# Patient Record
Sex: FEMALE | Race: BLACK OR AFRICAN AMERICAN | Employment: FULL TIME | URBAN - METROPOLITAN AREA
[De-identification: names, ages, dates, MRNs, and addresses within clinical notes are randomized per-mention and may not be internally consistent; named-entity substitution may affect disease eponyms.]

---
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
## 2023-01-04 DIAGNOSIS — M21.41 FLAT FEET, BILATERAL: Primary | ICD-10-CM

## 2023-01-04 DIAGNOSIS — M21.42 FLAT FEET, BILATERAL: Primary | ICD-10-CM

## 2023-01-05 ENCOUNTER — OFFICE VISIT (OUTPATIENT)
Dept: FAMILY MEDICINE CLINIC | Age: 18
End: 2023-01-05
Payer: MEDICAID

## 2023-01-05 VITALS
WEIGHT: 127.4 LBS | HEIGHT: 61 IN | SYSTOLIC BLOOD PRESSURE: 113 MMHG | OXYGEN SATURATION: 99 % | TEMPERATURE: 98.2 F | DIASTOLIC BLOOD PRESSURE: 70 MMHG | RESPIRATION RATE: 18 BRPM | BODY MASS INDEX: 24.05 KG/M2 | HEART RATE: 86 BPM

## 2023-01-05 DIAGNOSIS — Z87.898 HISTORY OF SYNCOPE: ICD-10-CM

## 2023-01-05 DIAGNOSIS — J00 ACUTE RHINITIS: Primary | ICD-10-CM

## 2023-01-05 DIAGNOSIS — R10.32 LEFT LOWER QUADRANT ABDOMINAL PAIN: ICD-10-CM

## 2023-01-05 RX ORDER — FLUTICASONE PROPIONATE 50 MCG
2 SPRAY, SUSPENSION (ML) NASAL DAILY
Qty: 1 EACH | Refills: 2 | Status: SHIPPED | OUTPATIENT
Start: 2023-01-05

## 2023-01-05 NOTE — PROGRESS NOTES
[address] [facility] [address] [place] [address]   Office [phone], Fax [phone]    Subjective:     Chief Complaint   Patient presents with    Sore Throat     X 3-4 days only when she wakes up or at night time    Nasal Congestion     X several weeks with cough    Form Completion       HPI:  Tami Salcido is a 21 y.o. female that presents for: sore throat and congestion for two weeks. [facility] and was [facility] Zyrtec and an intranasal antihistamine. These medications did not improve patient's symptoms so she stopped taking them in general first of this year. Patient also reports mild left lower quadrant pain which started this morning. Patient's last bowel movement was 2 days ago and normal in consistency. Patient denies any dysuria, nausea, vomiting, fever, chills, shortness of breath or any other complaints at this time. Patient also wanted to inquire about filling out paperwork for medical clearance to join the [facility]. Patient has a history of syncope which she has not had in over 2 years. Patient has been seen by cardiology and neurology and had extensive work-up which was benign. Patient has requested records to be sent to us but they have not arrived yet. Patient says she will contact her specialist today and will try to fax the records to us. ROS:   ROS    Health Maintenance:  Health Maintenance Due   Topic Date Due    Hepatitis C Screening  Never done    COVID-19 Vaccine (1) Never done    DTaP/Tdap/Td series (1 - Tdap) Never done    HPV Age 9Y-34Y (2 - 3-dose series) 03/26/2021    Flu Vaccine (1) Never done        Past Medical Hx  I personally reviewed. Past Medical History:   Diagnosis Date    Contact dermatitis and eczema due to cause     Comes and goes. Since a child; hasn't had it for the last 2-3 years    Fainting spell     has been told fainting spells or seizures lasts for 25 sec. (once a month); last episode about 2 years ago.  Has had a 30 day heart monitor which was normal. Also has had a head CT and EEG were normal.    Headache     Resolved now    Palpitations     last episode last year; has seen cardiology and has had a heart monitor which showed normal results        SocHx   I personally reviewed. Social History     Socioeconomic History    Marital status: SINGLE     Spouse name: Not on file    Number of children: Not on file    Years of education: Not on file    Highest education level: Not on file   Occupational History    Not on file   Tobacco Use    Smoking status: Never     Passive exposure: Never    Smokeless tobacco: Never   Vaping Use    Vaping Use: Never used   Substance and Sexual Activity    Alcohol use: Never    Drug use: Not Currently     Types: Marijuana     Comment: was using once a month; quit 1 year ago    Sexual activity: Yes     Partners: Male     Birth control/protection: Pill, Condom     Comment: Has a boyfriend   Other Topics Concern    Not on file   Social History Narrative    Not on file     Social Determinants of Health     Financial Resource Strain: Low Risk     Difficulty of Paying Living Expenses: Not hard at all   Food Insecurity: No Food Insecurity    Worried About Running Out of Food in the Last Year: Never true    Ran Out of Food in the Last Year: Never true   Transportation Needs: Not on file   Physical Activity: Not on file   Stress: Not on file   Social Connections: Not on file   Intimate Partner Violence: Not on file   Housing Stability: Not on file        Allergies  I personally reviewed. No Known Allergies     Medications  I personally reviewed. Current Outpatient Medications on File Prior to Visit   Medication Sig Dispense Refill    [DISCONTINUED] norethindrone-ethinyl estradiol (Junel FE 1/20, 28,) 1 mg-20 mcg (21)/75 mg (7) tab Take 1 Tablet by mouth daily. (Patient not taking: Reported on 1/5/2023) 3 Dose Pack 4     No current facility-administered medications on file prior to visit.         Objective: Vitals  I personally reviewed. Visit Vitals  /70 (BP 1 Location: Right upper arm, BP Patient Position: Sitting, BP Cuff Size: Adult)   Pulse 86   Temp 98.2 °F (36.8 °C) (Oral)   Resp 18   Ht 5' 1\" (1.549 m)   Wt 127 lb 6.4 oz (57.8 kg)   LMP 12/15/2022 (Approximate)   SpO2 99%   BMI 24.07 kg/m²        Physical Exam  Physical Exam     Vitals Reviewed. General AO x 3. No distress. Not diaphoretic. No jaundice. No cyanosis. No pallor. Neck No thyromegaly present. No JVD. No cervical adenopathy. Cardio Normal rate, regular rhythm. No murmur, rubs, or gallop. Pulmonary Effort normal. CTAB. No wheezes, rales, or rhonchi. Abdominal Soft. Bowel sounds normal. Mild tenderness to palpation in LLQ. No masses. No distension. Extremities No edema of lower extremities. Pulses present. Neurological CN II-XII grossly intact. No focal deficits. Skin No rash. Assessment/Plan:       Diagnoses and all orders for this visit:    1. Acute rhinitis -2-week history of congestion, postnasal drip, cough and no fever or chills or any other symptoms. Patient's symptoms did not improve with Zyrtec and intranasal antihistamine [facility].  Physical exam is unremarkable. We will prescribe Flonase 2 puff in each nostril daily and follow-up in 1 week if symptoms persist or fail to improve. Correct use of Flonase was reviewed with patient. -     fluticasone propionate (FLONASE) 50 mcg/actuation nasal spray; 2 Sprays by Both Nostrils route daily. 2. History of syncope- she had fainting spells or seizures lasts for 25 sec. During high school (once a month); last episode about 2 years ago. Has been evaluated by neurology and cardiology. Has had a 30 day heart monitor which was normal. Also has had a head CT and EEG were normal.  Now that she is trying to join the [facility] will need some paper work to be filled out and.   Patient advised that we can do this but we are still waiting to receive the medical records from her cardiology and neurology. Patient will contact the specialist today and we will try to fax medical records to our clinic. 3. Left lower quadrant abdominal pain -patient reporting left lower quadrant pain starting this morning with minimal nausea, vomiting or any other complaints. Physical exam remarkable for mild tenderness to palpation in the left lower quadrant but otherwise unremarkable. Patient offered further work-up with blood work versus monitoring and follow-up in 1 week. Patient is agreeable to follow-up in 1 week if symptoms persist or fail to improve. Follow up: Follow-up and Dispositions    Return in about 1 week (around 1/12/2023), or if symptoms worsen or fail to improve. Pt was discussed with Dr Marcelina River (attending physician). I have reviewed patient medical and social history and medications. I have reviewed pertinent labs results and other data. I have discussed the diagnosis with the patient and the intended plan as seen in the above orders. The patient has received an after-visit summary and questions were answered concerning future plans. I have discussed medication side effects and warnings with the patient as well.     Flaco Black MD  Resident [facility]  01/05/23

## 2023-01-05 NOTE — PROGRESS NOTES
Identified pt with two pt identifiers(name and ). Reviewed record in preparation for visit and have obtained necessary documentation. Chief Complaint   Patient presents with    Sore Throat     X 3-4 days only when she wakes up or at night time    Nasal Congestion     X several weeks with cough    Form Completion        Health Maintenance Due   Topic    Hepatitis C Screening     COVID-19 Vaccine (1)    DTaP/Tdap/Td series (1 - Tdap)    HPV Age 9Y-34Y (2 - 3-dose series)    Flu Vaccine (1)       Visit Vitals  /70 (BP 1 Location: Right upper arm, BP Patient Position: Sitting, BP Cuff Size: Adult)   Pulse 86   Temp 98.2 °F (36.8 °C) (Oral)   Resp 18   Ht 5' 1\" (1.549 m)   Wt 127 lb 6.4 oz (57.8 kg)   SpO2 99%   BMI 24.07 kg/m²         Coordination of Care Questionnaire:  :   1) Have you been to an emergency room, urgent care, or hospitalized since your last visit? If yes, where when, and reason for visit? no       2. Have seen or consulted any other health care provider since your last visit? If yes, where when, and reason for visit? NO        Patient is accompanied by self I have received verbal consent from Mayela Trevino to discuss any/all medical information while they are present in the room.

## 2023-01-09 NOTE — PROGRESS NOTES
[address] Medicine Residency Attending Addendum:  Patient encounter was discussed on the day of the encounter with Babita Eugene MD, performing the key elements of the service. I discussed the findings, assessment and plan with the resident and agree with the resident's findings and plan as documented in the resident's note.       Abe Lakhani MD, CAQSM, RMSK

## 2023-01-31 ENCOUNTER — OFFICE VISIT (OUTPATIENT)
Dept: FAMILY MEDICINE CLINIC | Age: 18
End: 2023-01-31
Payer: MEDICAID

## 2023-01-31 VITALS
SYSTOLIC BLOOD PRESSURE: 100 MMHG | HEART RATE: 89 BPM | WEIGHT: 129 LBS | RESPIRATION RATE: 16 BRPM | DIASTOLIC BLOOD PRESSURE: 65 MMHG | TEMPERATURE: 98.2 F | BODY MASS INDEX: 24.35 KG/M2 | OXYGEN SATURATION: 100 % | HEIGHT: 61 IN

## 2023-01-31 DIAGNOSIS — Z00.00 ANNUAL PHYSICAL EXAM: Primary | ICD-10-CM

## 2023-01-31 DIAGNOSIS — Z11.59 ENCOUNTER FOR HEPATITIS C SCREENING TEST FOR LOW RISK PATIENT: ICD-10-CM

## 2023-01-31 NOTE — PROGRESS NOTES
Vernell Merida is a 21 y.o. female    Chief Complaint   Patient presents with    Complete Physical     Patient is coming in for complete physical. Patient is requesting labs to check her iron levels. She was told she was anemic. Patient declined fu shot. No other concerns. 1. Have you been to the ER, urgent care clinic since your last visit? Hospitalized since your last visit? No    2. Have you seen or consulted any other health care providers outside of the [address] [place] [address] since your last visit? Include any pap smears or colon screening. No      Visit Vitals  /65 (BP 1 Location: Right upper arm, BP Patient Position: Sitting)   Pulse 89   Temp 98.2 °F (36.8 °C) (Oral)   Resp 16   Ht 5' 1\" (1.549 m)   Wt 129 lb (58.5 kg)   SpO2 100%   BMI 24.37 kg/m²           Health Maintenance Due   Topic Date Due    Hepatitis C Screening  Never done    COVID-19 Vaccine (1) Never done    DTaP/Tdap/Td series (1 - Tdap) Never done    HPV Age 9Y-34Y (2 - 3-dose series) 03/26/2021    Flu Vaccine (1) Never done         Medication Reconciliation completed, changes noted.   Please  Update medication list.

## 2023-01-31 NOTE — PROGRESS NOTES
Subjective   CC:  Leti Lewis is a 21 y.o. female who presents for annual physical.    H/o ISRRAEL:   - Dx'ed with ISRRAEL in past, was on iron supplements  - Trying to enlist in the [facility], for which she would be need to be off iron supplements  - Last iron supplementation was in June of 2022  - No f/c, n/v, lightheadedness, palp, dyspnea, cp, fatigue, HMB, melena, hematochezia, hematemesis, coffe ground emesis noted. Gyn care:   - Irregular periods on Nexplanon  - Having a lot of breakthrough bleeding since placement   - One male sexual partner in past year     HM:   - No fhx cancer (specifically breast, colon, or cervical)  - Last dentist appt Nov  - Last eye appt Nov     Complete ROS performed and pertinent positives/negatives are documented in HPI. Past Medical History  Past Medical History:   Diagnosis Date    Contact dermatitis and eczema due to cause     Comes and goes. Since a child; hasn't had it for the last 2-3 years    Fainting spell     has been told fainting spells or seizures lasts for 25 sec. (once a month); last episode about 2 years ago. Has had a 30 day heart monitor which was normal. Also has had a head CT and EEG were normal.    Headache     Resolved now    Palpitations     last episode last year; has seen cardiology and has had a heart monitor which showed normal results       Current Medications  Current Outpatient Medications   Medication Sig Dispense Refill    fluticasone propionate (FLONASE) 50 mcg/actuation nasal spray 2 Sprays by Both Nostrils route daily.  (Patient not taking: Reported on 1/31/2023) 1 Each 2       Allergies  No Known Allergies    Social History   Social History     Socioeconomic History    Marital status: SINGLE     Spouse name: Not on file    Number of children: Not on file    Years of education: Not on file    Highest education level: Not on file   Occupational History    Not on file   Tobacco Use    Smoking status: Never     Passive exposure: Never    Smokeless tobacco: Never   Vaping Use    Vaping Use: Never used   Substance and Sexual Activity    Alcohol use: Never    Drug use: Not Currently     Types: Marijuana     Comment: was using once a month; quit 1 year ago    Sexual activity: Yes     Partners: Male     Birth control/protection: Pill, Condom     Comment: Has a boyfriend   Other Topics Concern    Not on file   Social History Narrative    Not on file     Social Determinants of Health     Financial Resource Strain: Low Risk     Difficulty of Paying Living Expenses: Not hard at all   Food Insecurity: No Food Insecurity    Worried About Running Out of Food in the Last Year: Never true    Ran Out of Food in the Last Year: Never true   Transportation Needs: Not on file   Physical Activity: Not on file   Stress: Not on file   Social Connections: Not on file   Intimate Partner Violence: Not on file   Housing Stability: Not on file       Family History  Family History   Problem Relation Age of Onset    OSTEOARTHRITIS Mother     Asthma Mother     Bradycardia Mother     Stroke Father     Other Father         blood clots    Depression Father     Anxiety Father     Lung Disease Father         COPD    Alcohol abuse Father     Diabetes Father     Elevated Lipids Father         High Cholesterol    Headache Father     Heart Disease Father     Hypertension Father     No Known Problems Sister     No Known Problems Brother     Leukemia Maternal Grandmother        Objective   Vital Signs  Visit Vitals  /65 (BP 1 Location: Right upper arm, BP Patient Position: Sitting)   Pulse 89   Temp 98.2 °F (36.8 °C) (Oral)   Resp 16   Ht 5' 1\" (1.549 m)   Wt 129 lb (58.5 kg)   SpO2 100%   BMI 24.37 kg/m²       Physical Examination  Physical Exam  Constitutional:       Appearance: Normal appearance. HENT:      Head: Normocephalic and atraumatic. Eyes:      Conjunctiva/sclera: Conjunctivae normal.   Cardiovascular:      Rate and Rhythm: Normal rate and regular rhythm.    Pulmonary: Effort: Pulmonary effort is normal.      Breath sounds: Normal breath sounds. Abdominal:      General: Abdomen is flat. Palpations: Abdomen is soft. Musculoskeletal:         General: Normal range of motion. Cervical back: Normal range of motion and neck supple. Skin:     General: Skin is warm. Neurological:      General: No focal deficit present. Mental Status: She is alert and oriented to person, place, and time. Assessment and Plan   Juan Hernandez is a 21 y.o. who is here for annual physical.    1. Annual physical exam  Labs ordered today  return in one year   - CBC W/O DIFF; Future  - METABOLIC PANEL, COMPREHENSIVE; Future  - LIPID PANEL; Future  - HEPATITIS C AB; Future  - HEPATITIS C AB  - LIPID PANEL  - METABOLIC PANEL, COMPREHENSIVE  - CBC W/O DIFF    2. Encounter for hepatitis C screening test for low risk patient  - HEPATITIS C AB; Future  - HEPATITIS C AB     RTC 1 yr for annual physical or sooner prn. Patient is counseled to return to the office if symptoms do not improve as expected. Urgent consultation with the nearest Emergency Department is strongly recommended if condition worsens. Patient is counseled to follow up as recommended and to inform the office if any changes in treatment are recommended.       I discussed this patient with Dr. Henna Tiwari (Attending Physician)       Signed By:  Michael Morrison MD  Family Medicine Resident

## 2023-02-01 LAB
ALBUMIN SERPL-MCNC: 4.7 G/DL (ref 3.9–5)
ALBUMIN/GLOB SERPL: 1.6 {RATIO} (ref 1.2–2.2)
ALP SERPL-CCNC: 72 IU/L (ref 42–106)
ALT SERPL-CCNC: 10 IU/L (ref 0–32)
AST SERPL-CCNC: 15 IU/L (ref 0–40)
BILIRUB SERPL-MCNC: 0.7 MG/DL (ref 0–1.2)
BUN SERPL-MCNC: 8 MG/DL (ref 6–20)
BUN/CREAT SERPL: 10 (ref 9–23)
CALCIUM SERPL-MCNC: 9.6 MG/DL (ref 8.7–10.2)
CHLORIDE SERPL-SCNC: 103 MMOL/L (ref 96–106)
CHOLEST SERPL-MCNC: 113 MG/DL (ref 100–169)
CO2 SERPL-SCNC: 23 MMOL/L (ref 20–29)
CREAT SERPL-MCNC: 0.83 MG/DL (ref 0.57–1)
EGFRCR SERPLBLD CKD-EPI 2021: 104 ML/MIN/1.73
ERYTHROCYTE [DISTWIDTH] IN BLOOD BY AUTOMATED COUNT: 17.1 % (ref 11.7–15.4)
GLOBULIN SER CALC-MCNC: 3 G/DL (ref 1.5–4.5)
GLUCOSE SERPL-MCNC: 75 MG/DL (ref 70–99)
HCT VFR BLD AUTO: 38.6 % (ref 34–46.6)
HCV AB S/CO SERPL IA: <0.1 S/CO RATIO (ref 0–0.9)
HDLC SERPL-MCNC: 65 MG/DL
HGB BLD-MCNC: 12 G/DL (ref 11.1–15.9)
IMP & REVIEW OF LAB RESULTS: NORMAL
LDLC SERPL CALC-MCNC: 37 MG/DL (ref 0–109)
MCH RBC QN AUTO: 22.3 PG (ref 26.6–33)
MCHC RBC AUTO-ENTMCNC: 31.1 G/DL (ref 31.5–35.7)
MCV RBC AUTO: 72 FL (ref 79–97)
PLATELET # BLD AUTO: 357 X10E3/UL (ref 150–450)
POTASSIUM SERPL-SCNC: 4.3 MMOL/L (ref 3.5–5.2)
PROT SERPL-MCNC: 7.7 G/DL (ref 6–8.5)
RBC # BLD AUTO: 5.38 X10E6/UL (ref 3.77–5.28)
SODIUM SERPL-SCNC: 141 MMOL/L (ref 134–144)
TRIGL SERPL-MCNC: 43 MG/DL (ref 0–89)
VLDLC SERPL CALC-MCNC: 11 MG/DL (ref 5–40)
WBC # BLD AUTO: 7.2 X10E3/UL (ref 3.4–10.8)

## 2023-02-02 ENCOUNTER — TELEPHONE (OUTPATIENT)
Dept: FAMILY MEDICINE CLINIC | Age: 18
End: 2023-02-02

## 2023-02-02 NOTE — TELEPHONE ENCOUNTER
----- Message from Fred Correa sent at 1/30/2023 12:34 PM EST -----  Subject: Message to Provider    QUESTIONS  Information for Provider? Patient needs lab orders for anemia. Please call   to discuss further and schedule a lab appt.  ---------------------------------------------------------------------------  --------------  Mitra Avila QZ  [phone]; OK to leave message on voicemail  ---------------------------------------------------------------------------  --------------  SCRIPT ANSWERS  Relationship to Patient?  Self

## 2023-02-08 ENCOUNTER — TELEPHONE (OUTPATIENT)
Dept: FAMILY MEDICINE CLINIC | Age: 18
End: 2023-02-08

## 2023-02-08 NOTE — TELEPHONE ENCOUNTER
Patient came into clinic to  her letter. I stated that I did not see any letter and that it may not be completed yet. Patient stated she dropped of her medical records last week and it was placed in your folder. She also stated that she did get blood work done as well. Please let me know when this is completed so I can let patient know. Patient stated she does need this today.

## 2023-02-20 ENCOUNTER — HOSPITAL ENCOUNTER (EMERGENCY)
Age: 18
Discharge: HOME | End: 2023-02-20
Attending: PEDIATRICS
Payer: MEDICAID

## 2023-02-20 ENCOUNTER — OFFICE VISIT (OUTPATIENT)
Dept: OBGYN CLINIC | Age: 18
End: 2023-02-20
Payer: MEDICAID

## 2023-02-20 VITALS — WEIGHT: 134 LBS | BODY MASS INDEX: 25.32 KG/M2 | SYSTOLIC BLOOD PRESSURE: 114 MMHG | DIASTOLIC BLOOD PRESSURE: 74 MMHG

## 2023-02-20 VITALS
OXYGEN SATURATION: 100 % | BODY MASS INDEX: 25.12 KG/M2 | WEIGHT: 132.94 LBS | RESPIRATION RATE: 16 BRPM | DIASTOLIC BLOOD PRESSURE: 62 MMHG | TEMPERATURE: 98.6 F | HEART RATE: 66 BPM | SYSTOLIC BLOOD PRESSURE: 102 MMHG

## 2023-02-20 DIAGNOSIS — N93.9 ABNORMAL UTERINE BLEEDING (AUB): Primary | ICD-10-CM

## 2023-02-20 DIAGNOSIS — R55 SYNCOPE, UNSPECIFIED SYNCOPE TYPE: Primary | ICD-10-CM

## 2023-02-20 DIAGNOSIS — N93.9 VAGINAL BLEEDING: ICD-10-CM

## 2023-02-20 LAB
AMORPH CRY URNS QL MICRO: ABNORMAL
APPEARANCE UR: ABNORMAL
ATRIAL RATE: 61 BPM
BACTERIA URNS QL MICRO: ABNORMAL /HPF
BASOPHILS # BLD: 0 K/UL (ref 0–0.1)
BASOPHILS NFR BLD: 0 % (ref 0–1)
BILIRUB UR QL: NEGATIVE
CALCULATED P AXIS, ECG09: 55 DEGREES
CALCULATED R AXIS, ECG10: 49 DEGREES
CALCULATED T AXIS, ECG11: 36 DEGREES
COLOR UR: ABNORMAL
COMMENT, HOLDF: NORMAL
DIAGNOSIS, 93000: NORMAL
DIFFERENTIAL METHOD BLD: ABNORMAL
EOSINOPHIL # BLD: 0 K/UL (ref 0–0.4)
EOSINOPHIL NFR BLD: 0 % (ref 0–7)
EPITH CASTS URNS QL MICRO: ABNORMAL /LPF
ERYTHROCYTE [DISTWIDTH] IN BLOOD BY AUTOMATED COUNT: 17 % (ref 11.5–14.5)
GLUCOSE UR STRIP.AUTO-MCNC: NEGATIVE MG/DL
HCG SERPL QL: NEGATIVE
HCG URINE, QL. (POC): NEGATIVE
HCT VFR BLD AUTO: 37.9 % (ref 35–47)
HGB BLD-MCNC: 11.2 G/DL (ref 11.5–16)
HGB UR QL STRIP: ABNORMAL
IMM GRANULOCYTES # BLD AUTO: 0 K/UL (ref 0–0.04)
IMM GRANULOCYTES NFR BLD AUTO: 0 % (ref 0–0.5)
KETONES UR QL STRIP.AUTO: NEGATIVE MG/DL
LEUKOCYTE ESTERASE UR QL STRIP.AUTO: NEGATIVE
LYMPHOCYTES # BLD: 2.2 K/UL (ref 0.8–3.5)
LYMPHOCYTES NFR BLD: 25 % (ref 12–49)
MCH RBC QN AUTO: 22 PG (ref 26–34)
MCHC RBC AUTO-ENTMCNC: 29.6 G/DL (ref 30–36.5)
MCV RBC AUTO: 74.5 FL (ref 80–99)
MONOCYTES # BLD: 0.2 K/UL (ref 0–1)
MONOCYTES NFR BLD: 3 % (ref 5–13)
NEUTS SEG # BLD: 6.3 K/UL (ref 1.8–8)
NEUTS SEG NFR BLD: 72 % (ref 32–75)
NITRITE UR QL STRIP.AUTO: NEGATIVE
NRBC # BLD: 0 K/UL (ref 0–0.01)
NRBC BLD-RTO: 0 PER 100 WBC
P-R INTERVAL, ECG05: 142 MS
PH UR STRIP: 7.5 (ref 5–8)
PLATELET # BLD AUTO: 382 K/UL (ref 150–400)
PMV BLD AUTO: 9.1 FL (ref 8.9–12.9)
PROT UR STRIP-MCNC: NEGATIVE MG/DL
Q-T INTERVAL, ECG07: 400 MS
QRS DURATION, ECG06: 82 MS
QTC CALCULATION (BEZET), ECG08: 402 MS
RBC # BLD AUTO: 5.09 M/UL (ref 3.8–5.2)
RBC #/AREA URNS HPF: ABNORMAL /HPF (ref 0–5)
SAMPLES BEING HELD,HOLD: NORMAL
SP GR UR REFRACTOMETRY: 1.01 (ref 1–1.03)
TROPONIN I SERPL HS-MCNC: <3 NG/L (ref 0–37)
UR CULT HOLD, URHOLD: NORMAL
UROBILINOGEN UR QL STRIP.AUTO: 0.2 EU/DL (ref 0.2–1)
VALID INTERNAL CONTROL?: YES
VENTRICULAR RATE, ECG03: 61 BPM
WBC # BLD AUTO: 8.8 K/UL (ref 3.6–11)
WBC URNS QL MICRO: ABNORMAL /HPF (ref 0–4)

## 2023-02-20 PROCEDURE — 93005 ELECTROCARDIOGRAM TRACING: CPT

## 2023-02-20 PROCEDURE — 85025 COMPLETE CBC W/AUTO DIFF WBC: CPT

## 2023-02-20 PROCEDURE — 74011250636 HC RX REV CODE- 250/636: Performed by: STUDENT IN AN ORGANIZED HEALTH CARE EDUCATION/TRAINING PROGRAM

## 2023-02-20 PROCEDURE — 84484 ASSAY OF TROPONIN QUANT: CPT

## 2023-02-20 PROCEDURE — 81001 URINALYSIS AUTO W/SCOPE: CPT

## 2023-02-20 PROCEDURE — 99284 EMERGENCY DEPT VISIT MOD MDM: CPT

## 2023-02-20 PROCEDURE — 81025 URINE PREGNANCY TEST: CPT | Performed by: OBSTETRICS & GYNECOLOGY

## 2023-02-20 PROCEDURE — 36415 COLL VENOUS BLD VENIPUNCTURE: CPT

## 2023-02-20 PROCEDURE — 84703 CHORIONIC GONADOTROPIN ASSAY: CPT

## 2023-02-20 PROCEDURE — 99214 OFFICE O/P EST MOD 30 MIN: CPT | Performed by: OBSTETRICS & GYNECOLOGY

## 2023-02-20 RX ADMIN — SODIUM CHLORIDE 1000 ML: 9 INJECTION, SOLUTION INTRAVENOUS at 15:46

## 2023-02-20 NOTE — ED PROVIDER NOTES
Vaginal Bleeding  Pertinent negatives include no chest pain, no abdominal pain, no headaches and no shortness of breath. Patient is a 21 y.o. F who presents today with complaints of with past medical history of syncope who presents today after syncopal episode. She has had syncope in the past, had head CT, EEG, 30-day heart monitor, work-up was unremarkable, suspected vasovagal.  She came into the emergency department today after syncopal episode because she was concerned it was due to blood loss. She did have Nexplanon placed in December 2022 and reports daily vaginal bleeding since, sometimes up to 10 pads a day. She did actually see her OB/GYN today to discuss this and they recommended starting oral OCPs in addition to Nexplanon to help with vaginal bleeding and patient states that she did not want to do this. Patient reports today, she was seated in a chair, felt lightheaded and then had a syncopal episode lasting a few minutes, this was witnessed, did not fall, did not hit her head. The syncopal episode was the same in nature to previous syncopal episode she has, has not had one in about 2 years. No fevers, dysuria, confusion, seizure, no other complaints, no neurological complaints. Denies any abdominal pelvic pain, no vaginal discharge. ALLERGIES: Patient has no known allergies. Past Medical History:   Diagnosis Date    Contact dermatitis and eczema due to cause     Comes and goes. Since a child; hasn't had it for the last 2-3 years    Fainting spell     has been told fainting spells or seizures lasts for 25 sec. (once a month); last episode about 2 years ago.  Has had a 30 day heart monitor which was normal. Also has had a head CT and EEG were normal.    Headache     Resolved now    Palpitations     last episode last year; has seen cardiology and has had a heart monitor which showed normal results     Past Surgical History:   Procedure Laterality Date    HX TONSILLECTOMY  03/31/2022    HX WISDOM TEETH EXTRACTION  11/2020       Review of Systems   Constitutional:  Negative for fatigue and fever. HENT:  Negative for congestion. Respiratory:  Negative for cough, shortness of breath and wheezing. Cardiovascular:  Negative for chest pain. Gastrointestinal:  Negative for abdominal pain, constipation, diarrhea, nausea and vomiting. Genitourinary:  Positive for menstrual problem and vaginal bleeding. Negative for difficulty urinating, dysuria, flank pain, frequency, pelvic pain, vaginal discharge and vaginal pain. Musculoskeletal:  Negative for arthralgias and myalgias. Skin:  Negative for wound. Neurological:  Positive for syncope. Negative for dizziness, tremors, seizures, facial asymmetry, speech difficulty, weakness, light-headedness, numbness and headaches. Psychiatric/Behavioral:  Negative for confusion. Vitals:    02/20/23 1416 02/20/23 1420 02/20/23 1736   BP:  100/68 102/62   Pulse:  72 66   Resp:  16 16   Temp:  98.7 °F (37.1 °C) 98.6 °F (37 °C)   SpO2:  100% 100%   Weight: 60.3 kg (132 lb 15 oz)              Physical Exam  Vitals and nursing note reviewed. Constitutional:       General: She is not in acute distress. Appearance: Normal appearance. She is not ill-appearing, toxic-appearing or diaphoretic. HENT:      Head: Normocephalic and atraumatic. Nose: Nose normal.      Mouth/Throat:      Mouth: Mucous membranes are moist.   Eyes:      Pupils: Pupils are equal, round, and reactive to light. Cardiovascular:      Rate and Rhythm: Normal rate and regular rhythm. Pulmonary:      Effort: Pulmonary effort is normal. No respiratory distress. Breath sounds: Normal breath sounds. No stridor. No wheezing, rhonchi or rales. Chest:      Chest wall: No tenderness. Abdominal:      General: Abdomen is flat. Bowel sounds are normal.      Palpations: Abdomen is soft. Tenderness: There is no abdominal tenderness.  There is no right CVA tenderness, left CVA tenderness, guarding or rebound. Musculoskeletal:         General: Normal range of motion. Cervical back: Normal range of motion. Skin:     General: Skin is warm and dry. Neurological:      Mental Status: She is alert and oriented to person, place, and time. Mental status is at baseline. Cranial Nerves: No cranial nerve deficit. Sensory: Sensation is intact. Motor: No weakness. Coordination: Coordination is intact. Gait: Gait is intact. Psychiatric:         Mood and Affect: Mood normal.         Behavior: Behavior normal.         Thought Content: Thought content normal.            LABORATORY RESULTS:  Recent Results (from the past 24 hour(s))   AMB POC URINE PREGNANCY TEST, VISUAL COLOR COMPARISON    Collection Time: 02/20/23 11:06 AM   Result Value Ref Range    VALID INTERNAL CONTROL POC Yes     HCG urine, Ql. (POC) Negative Negative   CBC WITH AUTOMATED DIFF    Collection Time: 02/20/23  3:36 PM   Result Value Ref Range    WBC 8.8 3.6 - 11.0 K/uL    RBC 5.09 3.80 - 5.20 M/uL    HGB 11.2 (L) 11.5 - 16.0 g/dL    HCT 37.9 35.0 - 47.0 %    MCV 74.5 (L) 80.0 - 99.0 FL    MCH 22.0 (L) 26.0 - 34.0 PG    MCHC 29.6 (L) 30.0 - 36.5 g/dL    RDW 17.0 (H) 11.5 - 14.5 %    PLATELET 540 965 - 634 K/uL    MPV 9.1 8.9 - 12.9 FL    NRBC 0.0 0  WBC    ABSOLUTE NRBC 0.00 0.00 - 0.01 K/uL    NEUTROPHILS 72 32 - 75 %    LYMPHOCYTES 25 12 - 49 %    MONOCYTES 3 (L) 5 - 13 %    EOSINOPHILS 0 0 - 7 %    BASOPHILS 0 0 - 1 %    IMMATURE GRANULOCYTES 0 0.0 - 0.5 %    ABS. NEUTROPHILS 6.3 1.8 - 8.0 K/UL    ABS. LYMPHOCYTES 2.2 0.8 - 3.5 K/UL    ABS. MONOCYTES 0.2 0.0 - 1.0 K/UL    ABS. EOSINOPHILS 0.0 0.0 - 0.4 K/UL    ABS. BASOPHILS 0.0 0.0 - 0.1 K/UL    ABS. IMM.  GRANS. 0.0 0.00 - 0.04 K/UL    DF AUTOMATED     HCG QL SERUM    Collection Time: 02/20/23  3:36 PM   Result Value Ref Range    HCG, Ql. Negative NEG     SAMPLES BEING HELD    Collection Time: 02/20/23  3:36 PM   Result Value Ref Range SAMPLES BEING HELD 1RED, BLUE     COMMENT        Add-on orders for these samples will be processed based on acceptable specimen integrity and analyte stability, which may vary by analyte. TROPONIN-HIGH SENSITIVITY    Collection Time: 02/20/23  3:36 PM   Result Value Ref Range    Troponin-High Sensitivity <3 0 - 37 ng/L   EKG, 12 LEAD, INITIAL    Collection Time: 02/20/23  3:45 PM   Result Value Ref Range    Ventricular Rate 61 BPM    Atrial Rate 61 BPM    P-R Interval 142 ms    QRS Duration 82 ms    Q-T Interval 400 ms    QTC Calculation (Bezet) 402 ms    Calculated P Axis 55 degrees    Calculated R Axis 49 degrees    Calculated T Axis 36 degrees    Diagnosis       Normal sinus rhythm  Normal ECG  When compared with ECG of 18-NOV-2021 14:46,  No significant change was found     URINALYSIS W/MICROSCOPIC    Collection Time: 02/20/23  5:10 PM   Result Value Ref Range    Color YELLOW/STRAW      Appearance TURBID (A) CLEAR      Specific gravity 1.014 1.003 - 1.030      pH (UA) 7.5 5.0 - 8.0      Protein Negative NEG mg/dL    Glucose Negative NEG mg/dL    Ketone Negative NEG mg/dL    Bilirubin Negative NEG      Blood LARGE (A) NEG      Urobilinogen 0.2 0.2 - 1.0 EU/dL    Nitrites Negative NEG      Leukocyte Esterase Negative NEG      WBC 0-4 0 - 4 /hpf    RBC 0-5 0 - 5 /hpf    Epithelial cells FEW FEW /lpf    Bacteria 1+ (A) NEG /hpf    Amorphous Crystals 1+ (A) NEG   URINE CULTURE HOLD SAMPLE    Collection Time: 02/20/23  5:10 PM    Specimen: Urine   Result Value Ref Range    Urine culture hold        Urine on hold in Microbiology dept for 2 days. If unpreserved urine is submitted, it cannot be used for addtional testing after 24 hours, recollection will be required. IMAGING RESULTS:  No results found.     MEDICATIONS GIVEN:  Medications   sodium chloride 0.9 % bolus infusion 1,000 mL (1,000 mL IntraVENous New Bag 2/20/23 6239)            Medical Decision Making  Amount and/or Complexity of Data Reviewed  Labs: ordered. ECG/medicine tests: ordered. Patient presents today after syncopal episode. Sustained no trauma or injury from fall. Physical exam essentially unremarkable, no focal neurological deficits, alert and oriented x4, no head laceration, no cervical midline tenderness. Given history, exam and workup, low suspicion for HF, ICH (no trauma, headache), seizure (no witnessed seizure like activity, no postictal period, tongue laceration, bladder incontinence), stroke (no focal neuro deficits), HOCM (no murmur, family history of sudden death), ACS (neg troponin, no anginal pain), aortic dissection (no chest pain). no malignant arrhythmia on ekg or any family history of sudden death. no concern for GI bleed (stable hgb). Low suspicion for PE given normal vital signs, absence of chest pain or dyspnea, no evidence of DVT, no recent surgery/immobilization. Based on [place] syncope rule, patient is low risk and well appearing here, plan to discharge the patient home with PMD follow up. Patient has similar episodes in the past and has had extensive work-up which was unremarkable. Patient was mainly concerned that she had anemia because she has had increased vaginal bleeding. Hemoglobin stable. Patient to follow back up with OB/GYN to discuss vaginal bleeding and removal of Nexplanon. Did give very strict return precautions. Discussed results and work-up with patient and answered all questions, the patient expresses understanding and agrees with the care plan and disposition. The patient was given an opportunity to ask questions and all concerns raised were addressed prior to discharge. Recommended patient follow-up with provider as listed below. Counseled patient on standard home and self-care measures. Specifically explained the emergent conditions that could arise and clearly instructed the patient to return to the emergency department for those and any other new, worsening, or concerning symptoms. Patient stable and ready for discharge. History, exam, medical decision making, and plan discussed with ED attending, Dr. Spring Arshad. IMPRESSION:  1. Syncope, unspecified syncope type    2. Vaginal bleeding        DISPOSITION:  Discharge    PLAN:  Follow-up Information       Follow up With Specialties Details Why Contact Info    Ester Oliveros MD Family Medicine Schedule an appointment as soon as possible for a visit   [address] [place] [address] Renato Luong   [phone]            Current Discharge Medication List                                       Please note that this dictation was completed with [employer], the computer voice recognition software. Quite often unanticipated grammatical, syntax, homophones, and other interpretive errors are inadvertently transcribed by the computer software. Please disregard these errors. Please excuse any errors that have escaped final proofreading.

## 2023-02-20 NOTE — PROGRESS NOTES
OB/GYN Problem VIsit    HPI  Seward Babinski is a No obstetric history on file. ,  21 y.o. female who presents for a problem visit. She comes in complaining of continuous bleeding since insertion of her Nexplanon in December 2022. Prior to the Nexplanon she was having regular but heavy menses. She has also had some lightheadedness associated with the bleeding. Per Nursing Note:  Patient's last menstrual period was 02/13/2023 (exact date). Birth Control: Implant. Last Pap: The pt has never had a pap smear. The patient is reporting having:  vaginal bleeding since the insertion of Implant in Dec. 2022. She states the bleeding is heavy and associated with quarter size clots. Denies any severe cramping. She gets lightheaded at times. Past Medical History:   Diagnosis Date    Contact dermatitis and eczema due to cause     Comes and goes. Since a child; hasn't had it for the last 2-3 years    Fainting spell     has been told fainting spells or seizures lasts for 25 sec. (once a month); last episode about 2 years ago.  Has had a 30 day heart monitor which was normal. Also has had a head CT and EEG were normal.    Headache     Resolved now    Palpitations     last episode last year; has seen cardiology and has had a heart monitor which showed normal results     Past Surgical History:   Procedure Laterality Date    HX TONSILLECTOMY  03/31/2022    HX WISDOM TEETH EXTRACTION  11/2020     Social History     Occupational History    Not on file   Tobacco Use    Smoking status: Never     Passive exposure: Never    Smokeless tobacco: Never   Vaping Use    Vaping Use: Never used   Substance and Sexual Activity    Alcohol use: Never    Drug use: Not Currently     Types: Marijuana     Comment: was using once a month; quit 1 year ago    Sexual activity: Yes     Partners: Male     Birth control/protection: Pill, Condom     Comment: Has a boyfriend     Family History   Problem Relation Age of Onset    OSTEOARTHRITIS Mother     Asthma Mother     Bradycardia Mother     Stroke Father     Other Father         blood clots    Depression Father     Anxiety Father     Lung Disease Father         COPD    Alcohol abuse Father     Diabetes Father     Elevated Lipids Father         High Cholesterol    Headache Father     Heart Disease Father     Hypertension Father     No Known Problems Sister     No Known Problems Brother     Leukemia Maternal Grandmother        No Known Allergies  Prior to Admission medications    Medication Sig Start Date End Date Taking? Authorizing Provider   norethindrone-ethinyl estradiol (ORTHO-NOVUM 1-35 TAB, NORTREL 1-35 TAB) 1-35 mg-mcg tab Take 1 Tablet by mouth daily. 2/20/23  Yes Raymundo Joshi MD   fluticasone propionate (FLONASE) 50 mcg/actuation nasal spray 2 Sprays by Both Nostrils route daily. Patient not taking: No sig reported 1/5/23   Emili Mcneal MD        Review of Systems: History obtained from the patient  Constitutional: negative for weight loss, fever, night sweats  Breast: negative for breast lumps, nipple discharge, galactorrhea  GI: negative for change in bowel habits, abdominal pain, black or bloody stools  : negative for frequency, dysuria, hematuria, vaginal discharge  MSK: negative for back pain, joint pain, muscle pain  Skin: negative for itching, rash, hives  Psych: negative for anxiety, depression, change in mood      Objective:  Visit Vitals  /74   Wt 134 lb (60.8 kg)   LMP 02/13/2023 (Exact Date)   BMI 25.32 kg/m²       Physical Exam:   PHYSICAL EXAMINATION    Constitutional  Appearance: well-nourished, well developed, alert, in no acute distress      Skin  General Inspection: no rash, no lesions identified    Neurologic/Psychiatric  Mental Status:  Orientation: grossly oriented to person, place and time  Mood and Affect: mood normal, affect appropriate      ASSESSMENT:    ICD-10-CM ICD-9-CM    1.  Abnormal uterine bleeding (AUB)  N93.9 626.9 AMB POC URINE PREGNANCY TEST, VISUAL COLOR COMPARISON      CT/NG/T.VAGINALIS AMPLIFICATION      CBC WITH AUTOMATED DIFF      CBC WITH AUTOMATED DIFF          PLAN:  Orders Placed This Encounter    CT/NG/T.VAGINALIS AMPLIFICATION     Order Specific Question:   Specimen source     Answer:   Urine [258]    CBC WITH AUTOMATED DIFF     Standing Status:   Future     Number of Occurrences:   1     Standing Expiration Date:   2/20/2024    AMB POC URINE PREGNANCY TEST, VISUAL COLOR COMPARISON    norethindrone-ethinyl estradiol (ORTHO-NOVUM 1-35 TAB, NORTREL 1-35 TAB) 1-35 mg-mcg tab     Sig: Take 1 Tablet by mouth daily. Dispense:  3 Dose Pack     Refill:  1   A urine pregnancy test sent today is negative. Plan is to have her get a CBC due to her lightheadedness. We also sent out gonorrhea and chlamydia test off the urine. She will start birth control pills today and assuming it helps control her bleeding she will decide sometime in the next few months whether she wants to have the Nexplanon removed or continue the pill plus the Nexplanon for the near future.

## 2023-02-20 NOTE — ED TRIAGE NOTES
Triage Note: Pt reports she has had nexplanon since December. Pt reports vaginal bleeding since, but now she is passing clots. Pt reports bleeding became heavier earlier this month. Pt seen at an OBGYN today and told to get on birth control pills, but pt reports she has done that before and it made it worse. Pt also reports she has become light headed, dizzy, and fatigued.

## 2023-02-23 LAB
C TRACH RRNA SPEC QL NAA+PROBE: NEGATIVE
N GONORRHOEA RRNA SPEC QL NAA+PROBE: NEGATIVE
SPECIMEN STATUS REPORT, ROLRST: NORMAL
T VAGINALIS RRNA SPEC QL NAA+PROBE: NEGATIVE

## 2023-02-27 NOTE — PROGRESS NOTES
Brad Galeas is a 21 y.o. female presents for a problem visit. No chief complaint on file. Problems: Abnormal Bleeding     Patient's last menstrual period was 02/13/2023 (exact date). Birth Control: implant. Wants it removed and start OCP. Procedure note: Nexplanon/Implanon removal    Brad Galeas is a No obstetric history on file. ,  21 y.o. female whose Patient's last menstrual period was 02/13/2023 (exact date). .  She presents for office removal of a NEXPLANON/IMPLANON sub-dermal contraceptive implant. Procedure:  She was positioned so the site of her implant was visable and easily accessible. The implant was located by palpation. The end of the implant nearest the elbow was marked with a sterile marker. The operative site was cleansed with Betadine. Using a 27 gauge needle on a 3cc syringe and 1% lidocaine, 3 cc were infiltrated as a intradermal wheal and underneath the end of the implant closest to the elbow. Downward pressure was applied on the end of the implant nearest the axilla and a 2-3mm incision was made in the longitudinal direction of the arm at the tip of the implant closest to the elbow. The implant was then pushed gently toward the incision until the tip was visible. The fibrous capsule was opened with a combination of blunt and sharp dissection. The implant was grasped with mosquito forceps and removed intact. It measured a full 4 cm in length. The skin was cleansed and dried. A steristrip was applied then topped with a folded 4x4 gauze and a Curlex pressure dressing. There were no complication or problems. She demonstrated full active range of movement of her elbow, wrist, all five digits and denied numbness and tingling. Post-procedure:  She was told to remove the dressing in 12-24 hours, to keep the incision area dry for 24 hours and to remove the Steristrip in 5-7 days. She was given our 24-hour phone number and encouraged to call if there are any problems.   Rx Sprintec            Chart reviewed for the following:   Sherie Dickinson LPN, have reviewed the History, Physical and updated the Allergic reactions for Sue Sparks 11 performed immediately prior to start of procedure:   Sherie Dickinson LPN, have performed the following reviews on Martín Francisco prior to the start of the procedure:            * Patient was identified by name and date of birth   * Agreement on procedure being performed was verified  * Risks and Benefits explained to the patient  * Procedure site verified and marked as necessary  * Patient was positioned for comfort  * Consent was signed and verified     Time: 3:40pm    Date of procedure: 3/1/2023    Procedure performed by: Omar Freedman MD    Provider assisted by: Channing Hurt LPN    Patient assisted by: self    How tolerated by patient: tolerated the procedure well with no complications    Post Procedural Pain Scale: 0 - No Hurt    Comments: none    Examination chaperoned by Ed Lindsey LPN.

## 2023-03-01 ENCOUNTER — OFFICE VISIT (OUTPATIENT)
Dept: OBGYN CLINIC | Age: 18
End: 2023-03-01
Payer: MEDICAID

## 2023-03-01 VITALS — DIASTOLIC BLOOD PRESSURE: 68 MMHG | SYSTOLIC BLOOD PRESSURE: 103 MMHG | BODY MASS INDEX: 24.37 KG/M2 | WEIGHT: 129 LBS

## 2023-03-01 DIAGNOSIS — Z30.011 ENCOUNTER FOR INITIAL PRESCRIPTION OF CONTRACEPTIVE PILLS: ICD-10-CM

## 2023-03-01 DIAGNOSIS — Z30.46 ENCOUNTER FOR NEXPLANON REMOVAL: Primary | ICD-10-CM

## 2023-03-01 PROCEDURE — 11982 REMOVE DRUG IMPLANT DEVICE: CPT | Performed by: OBSTETRICS & GYNECOLOGY

## 2023-03-01 RX ORDER — NORGESTIMATE AND ETHINYL ESTRADIOL 0.25-0.035
1 KIT ORAL DAILY
Qty: 3 DOSE PACK | Refills: 3 | Status: SHIPPED | OUTPATIENT
Start: 2023-03-01

## 2023-04-19 ENCOUNTER — PATIENT MESSAGE (OUTPATIENT)
Dept: FAMILY MEDICINE CLINIC | Age: 18
End: 2023-04-19

## 2023-05-22 RX ORDER — NORGESTIMATE AND ETHINYL ESTRADIOL 0.25-0.035
1 KIT ORAL DAILY
COMMUNITY
Start: 2023-03-01

## 2023-10-10 ENCOUNTER — TELEPHONE (OUTPATIENT)
Age: 18
End: 2023-10-10

## 2023-10-10 NOTE — TELEPHONE ENCOUNTER
This writer verified patient by 2 identifier. Inquired the timeframe patient need paperwork sign by Physician. Patient states this have been addressed already by another Physician as Dr Diane Ibarra is out to office currently. No further concerns at this time.

## 2023-10-10 NOTE — TELEPHONE ENCOUNTER
----- Message from Lesley Jimenez sent at 10/9/2023  4:10 PM EDT -----  Subject: Message to Provider    QUESTIONS  Information for Provider? pt is needing a new letter for the Randy Montanez, as   the one from this past feb 2/8/23 is not in the new system, she needs it   actually signed by her PCP as well for this, please advise.   ---------------------------------------------------------------------------  --------------  [address] [place]  [phone]; OK to leave message on voicemail  ---------------------------------------------------------------------------  --------------  SCRIPT ANSWERS  Relationship to Patient?  Self

## 2024-03-29 ENCOUNTER — TELEPHONE (OUTPATIENT)
Age: 19
End: 2024-03-29

## 2024-03-29 NOTE — TELEPHONE ENCOUNTER
Attempted to reach patient to inform her that the appointment that she made for her pap smear is for a male doctor. Left voicemail stating that if she prefers a female then she would need to reschedule.

## 2024-06-20 ENCOUNTER — HOSPITAL ENCOUNTER (EMERGENCY)
Age: 19
Discharge: HOME | End: 2024-06-20
Attending: FAMILY MEDICINE
Payer: MEDICAID

## 2024-06-20 VITALS
HEART RATE: 66 BPM | HEIGHT: 61 IN | TEMPERATURE: 98.8 F | SYSTOLIC BLOOD PRESSURE: 110 MMHG | OXYGEN SATURATION: 100 % | WEIGHT: 120 LBS | DIASTOLIC BLOOD PRESSURE: 85 MMHG | BODY MASS INDEX: 22.66 KG/M2 | RESPIRATION RATE: 16 BRPM
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
## 2024-06-20 DIAGNOSIS — S31.159A DOG BITE OF GROIN: Primary | ICD-10-CM

## 2024-06-20 PROCEDURE — 99283 EMERGENCY DEPT VISIT LOW MDM: CPT

## 2024-06-20 PROCEDURE — 6370000000 HC RX 637 (ALT 250 FOR IP): Performed by: FAMILY MEDICINE

## 2024-06-20 RX ORDER — AMOXICILLIN AND CLAVULANATE POTASSIUM 875; 125 MG/1; MG/1
1 TABLET, FILM COATED ORAL
Status: COMPLETED | OUTPATIENT
Start: 2024-06-20 | End: 2024-06-20

## 2024-06-20 RX ORDER — AMOXICILLIN AND CLAVULANATE POTASSIUM 875; 125 MG/1; MG/1
1 TABLET, FILM COATED ORAL 2 TIMES DAILY
Qty: 20 TABLET | Refills: 0 | Status: SHIPPED | OUTPATIENT
Start: 2024-06-20 | End: 2024-06-30

## 2024-06-20 RX ADMIN — AMOXICILLIN AND CLAVULANATE POTASSIUM 1 TABLET: 875; 125 TABLET, FILM COATED ORAL at 21:51

## 2024-06-20 ASSESSMENT — PAIN - FUNCTIONAL ASSESSMENT: PAIN_FUNCTIONAL_ASSESSMENT: 0-10

## 2024-06-20 ASSESSMENT — LIFESTYLE VARIABLES
HOW OFTEN DO YOU HAVE A DRINK CONTAINING ALCOHOL: NEVER
HOW MANY STANDARD DRINKS CONTAINING ALCOHOL DO YOU HAVE ON A TYPICAL DAY: PATIENT DOES NOT DRINK

## 2024-06-20 ASSESSMENT — PAIN DESCRIPTION - LOCATION: LOCATION: VAGINA

## 2024-06-20 ASSESSMENT — PAIN SCALES - GENERAL: PAINLEVEL_OUTOF10: 3

## 2024-06-20 ASSESSMENT — PAIN DESCRIPTION - DESCRIPTORS: DESCRIPTORS: ACHING

## 2024-06-21 ASSESSMENT — ENCOUNTER SYMPTOMS
GASTROINTESTINAL NEGATIVE: 1
RESPIRATORY NEGATIVE: 1

## 2024-06-21 NOTE — ED TRIAGE NOTES
Pt. Experienced a dog bite to her left labia around 2.5 hrs ago when walking.  [facility] has been contacted. Pt. States the dog was on a porch in a house she was trying to enter and the dog lunged at her. Pt. Did state that she asked the owner about being up to date on shots and the owner did confirm.

## 2024-06-21 NOTE — ED NOTES
[facility] at bedside at this time.    I have reviewed discharge instructions with the patient.  The patient verbalized understanding.

## 2024-06-21 NOTE — ED PROVIDER NOTES
with the below findings:    NA    Interpretation per the Radiologist below, if available at the time of this note:    No orders to display         ED BEDSIDE ULTRASOUND:   Performed by ED Physician - none    LABS:  Labs Reviewed - No data to display    All other labs were within normal range or not returned as of this dictation.    EMERGENCY DEPARTMENT COURSE and DIFFERENTIAL DIAGNOSIS/MDM:   Vitals:    Vitals:    06/20/24 2120   BP: 110/85   Pulse: 66   Resp: 16   Temp: 98.8 °F (37.1 °C)   TempSrc: Oral   SpO2: 100%   Weight: 54.4 kg (120 lb)   Height: 1.549 m (5' 1\")       Medical Decision Making  Ddx including puncture wound, vascular injury, tendon injury, need for rabies prophylaxis. No indication for rabies prophylaxis as patient was able to verify vaccination status of the animal. No evidence of underlying structure injury. Will prescribe abx based on location. Wound care discussed. Stable for discharge with outpatient follow up. Return precautions given    Amount and/or Complexity of Data Reviewed  External Data Reviewed: notes.    Risk  Prescription drug management.            REASSESSMENT          CRITICAL CARE TIME   Total Critical Care time was 0 minutes, excluding separately reportable procedures.  There was a high probability of clinically significant/life threatening deterioration in the patient's condition which required my urgent intervention.      CONSULTS:  None    PROCEDURES:  Unless otherwise noted below, none     Procedures      FINAL IMPRESSION      1. Dog bite of groin          DISPOSITION/PLAN   DISPOSITION Decision To Discharge 06/20/2024 09:23:49 PM      PATIENT REFERRED TO:  Dennis Manley MD  [address] [facility] [address] [phone]    Schedule an appointment as soon as possible for a visit in 1 week  As needed, For wound re-check      DISCHARGE MEDICATIONS:  New Prescriptions    AMOXICILLIN-CLAVULANATE (AUGMENTIN) 875-125 MG PER TABLET    Take 1 tablet by mouth 2 times

## 2024-06-21 NOTE — DISCHARGE INSTRUCTIONS
Keep area clean and dry. Take antibiotic as prescribed. Follow up with your Primary Doctor. Return to the ED for new or worsening symptoms

## 2024-09-01 ENCOUNTER — HOSPITAL ENCOUNTER (EMERGENCY)
Age: 19
Discharge: HOME | End: 2024-09-01
Attending: EMERGENCY MEDICINE
Payer: MEDICAID

## 2024-09-01 VITALS
HEIGHT: 60 IN | OXYGEN SATURATION: 100 % | TEMPERATURE: 98.2 F | DIASTOLIC BLOOD PRESSURE: 81 MMHG | SYSTOLIC BLOOD PRESSURE: 130 MMHG | BODY MASS INDEX: 25.13 KG/M2 | WEIGHT: 128 LBS | HEART RATE: 79 BPM | RESPIRATION RATE: 16 BRPM

## 2024-09-01 DIAGNOSIS — Z34.91 NORMAL PREGNANCY IN FIRST TRIMESTER: Primary | ICD-10-CM

## 2024-09-01 LAB
AMORPH CRY URNS QL MICRO: ABNORMAL
APPEARANCE UR: ABNORMAL
BACTERIA URNS QL MICRO: ABNORMAL /HPF
BILIRUB UR QL: NEGATIVE
COLOR UR: YELLOW
EPITH CASTS URNS QL MICRO: ABNORMAL /LPF (ref 0–20)
GLUCOSE UR STRIP.AUTO-MCNC: NEGATIVE MG/DL
HCG UR QL: POSITIVE
HGB UR QL STRIP: NEGATIVE
KETONES UR QL STRIP.AUTO: NEGATIVE MG/DL
LEUKOCYTE ESTERASE UR QL STRIP.AUTO: NEGATIVE
NITRITE UR QL STRIP.AUTO: NEGATIVE
PH UR STRIP: 7.5 (ref 5–8)
PROT UR STRIP-MCNC: NEGATIVE MG/DL
RBC #/AREA URNS HPF: ABNORMAL /HPF (ref 0–2)
SP GR UR REFRACTOMETRY: 1.02 (ref 1–1.03)
URINE CULTURE IF INDICATED: ABNORMAL
UROBILINOGEN UR QL STRIP.AUTO: 0.2 EU/DL (ref 0.2–1)
WBC URNS QL MICRO: ABNORMAL /HPF (ref 0–4)

## 2024-09-01 PROCEDURE — 81025 URINE PREGNANCY TEST: CPT

## 2024-09-01 PROCEDURE — 87591 N.GONORRHOEAE DNA AMP PROB: CPT

## 2024-09-01 PROCEDURE — 87491 CHLMYD TRACH DNA AMP PROBE: CPT

## 2024-09-01 PROCEDURE — 81001 URINALYSIS AUTO W/SCOPE: CPT

## 2024-09-01 PROCEDURE — 87661 TRICHOMONAS VAGINALIS AMPLIF: CPT

## 2024-09-01 PROCEDURE — 99283 EMERGENCY DEPT VISIT LOW MDM: CPT

## 2024-09-01 RX ORDER — ONDANSETRON 4 MG/1
4 TABLET, ORALLY DISINTEGRATING ORAL 3 TIMES DAILY PRN
Qty: 21 TABLET | Refills: 0 | Status: SHIPPED | OUTPATIENT
Start: 2024-09-01

## 2024-09-01 RX ORDER — CEPHALEXIN 500 MG/1
500 CAPSULE ORAL 3 TIMES DAILY
Qty: 15 CAPSULE | Refills: 0 | Status: SHIPPED | OUTPATIENT
Start: 2024-09-01 | End: 2024-09-06

## 2024-09-01 ASSESSMENT — PAIN SCALES - GENERAL: PAINLEVEL_OUTOF10: 0

## 2024-09-01 ASSESSMENT — PAIN - FUNCTIONAL ASSESSMENT: PAIN_FUNCTIONAL_ASSESSMENT: 0-10

## 2024-09-01 NOTE — ED PROVIDER NOTES
EMERGENCY DEPARTMENT HISTORY AND PHYSICAL EXAM    Date: 9/1/2024  Patient Name: Esperanza Martinez    History of Presenting Illness     Chief Complaint   Patient presents with    pregnancy    pelvic cramping         History Provided By: Patient    Additional History (Context):   10:16 AM EDT  Esperanza Martinez is a 19 y.o. female G1, P0, LMP July 13 with PMHX of palpitations, syncopal episodes, headaches who presents to the emergency department C/O pregnancy.  Patient arrives with symptoms of pregnancy.  She already has an appointment set up September 12 however had a couple of days of abdominal cramping.  Her cramping have resolved.  She has no vaginal bleeding.  She denies any discharge.  She has urinary frequency and urgency but no dysuria.  She denies any dyspareunia.    Social History  No smoking drinking or drugs    Family History  Unremarkable family history.    PCP: Yael Brooks MD    No current facility-administered medications for this encounter.     Current Outpatient Medications   Medication Sig Dispense Refill    Prenatal MV-Min-Fe Cbn-FA-DHA (PRENATAL PLUS DHA PO) Take by mouth       Past History     Past Medical History:  Past Medical History:   Diagnosis Date    Contact dermatitis and eczema due to cause     Comes and goes. Since a child; hasn't had it for the last 2-3 years    Fainting spell     has been told fainting spells or seizures lasts for 25 sec. (once a month); last episode about 2 years ago. Has had a 30 day heart monitor which was normal. Also has had a head CT and EEG were normal.    Headache     Resolved now    Palpitations     last episode last year; has seen cardiology and has had a heart monitor which showed normal results       Past Surgical History:  Past Surgical History:   Procedure Laterality Date    TONSILLECTOMY  03/31/2022    WISDOM TOOTH EXTRACTION  11/2020       Family History:  Family History   Problem Relation Age of Onset    Lung Disease Father         COPD    Alcohol Abuse Father

## 2024-09-01 NOTE — ED TRIAGE NOTES
Patient has scheduled appointment with [facility] Gabriela Mary MD for September 12, 2024

## 2024-09-01 NOTE — ED TRIAGE NOTES
Patient states that she is currently pregnant per home pregnancy test on August 10, 2024.  She states that last menstrual period was July 7 -13, 2024.  She states that she experienced pelvic cramping earlier today, but denies cramping at present.  She denies any vaginal bleeding or abnormal vaginal discharge.

## 2024-09-03 LAB
C TRACH RRNA SPEC QL NAA+PROBE: NEGATIVE
N GONORRHOEA RRNA SPEC QL NAA+PROBE: NEGATIVE
SPECIMEN SOURCE: NORMAL

## 2024-09-04 LAB
SPECIMEN SOURCE: NORMAL
T VAGINALIS RRNA SPEC QL NAA+PROBE: NEGATIVE

## 2024-09-12 ENCOUNTER — ROUTINE PRENATAL (OUTPATIENT)
Age: 19
End: 2024-09-12

## 2024-09-12 VITALS
BODY MASS INDEX: 24.22 KG/M2 | DIASTOLIC BLOOD PRESSURE: 79 MMHG | WEIGHT: 124 LBS | HEART RATE: 73 BPM | SYSTOLIC BLOOD PRESSURE: 124 MMHG

## 2024-09-12 DIAGNOSIS — Z78.9 UNKNOWN VARICELLA VACCINATION STATUS: ICD-10-CM

## 2024-09-12 DIAGNOSIS — Z34.00 INITIAL OBSTETRIC VISIT, ANTEPARTUM: Primary | ICD-10-CM

## 2024-09-12 DIAGNOSIS — Z12.4 SCREENING FOR CERVICAL CANCER: ICD-10-CM

## 2024-09-12 DIAGNOSIS — Z11.3 SCREEN FOR STD (SEXUALLY TRANSMITTED DISEASE): ICD-10-CM

## 2024-09-12 DIAGNOSIS — Z34.90 ENCOUNTER FOR SUPERVISION OF NORMAL PREGNANCY, ANTEPARTUM, UNSPECIFIED GRAVIDITY: Primary | ICD-10-CM

## 2024-09-12 PROCEDURE — 0500F INITIAL PRENATAL CARE VISIT: CPT | Performed by: OBSTETRICS & GYNECOLOGY

## 2024-09-13 LAB
ABO GROUP BLD: NORMAL
BACTERIA UR CULT: NO GROWTH
BASOPHILS # BLD AUTO: 0 X10E3/UL (ref 0–0.2)
BASOPHILS NFR BLD AUTO: 0 %
BLD GP AB SCN SERPL QL: NEGATIVE
EOSINOPHIL # BLD AUTO: 0 X10E3/UL (ref 0–0.4)
EOSINOPHIL NFR BLD AUTO: 0 %
ERYTHROCYTE [DISTWIDTH] IN BLOOD BY AUTOMATED COUNT: 16.7 % (ref 11.7–15.4)
FERRITIN SERPL-MCNC: 13 NG/ML (ref 15–150)
HBV SURFACE AG SERPL QL IA: NEGATIVE
HCT VFR BLD AUTO: 44.3 % (ref 34–46.6)
HCV IGG SERPL QL IA: NON REACTIVE
HGB BLD-MCNC: 14.2 G/DL (ref 11.1–15.9)
HIV 1+2 AB+HIV1 P24 AG SERPL QL IA: NON REACTIVE
IMM GRANULOCYTES # BLD AUTO: 0 X10E3/UL (ref 0–0.1)
IMM GRANULOCYTES NFR BLD AUTO: 1 %
LYMPHOCYTES # BLD AUTO: 2.1 X10E3/UL (ref 0.7–3.1)
LYMPHOCYTES NFR BLD AUTO: 25 %
MCH RBC QN AUTO: 27.5 PG (ref 26.6–33)
MCHC RBC AUTO-ENTMCNC: 32.1 G/DL (ref 31.5–35.7)
MCV RBC AUTO: 86 FL (ref 79–97)
MONOCYTES # BLD AUTO: 0.3 X10E3/UL (ref 0.1–0.9)
MONOCYTES NFR BLD AUTO: 4 %
NEUTROPHILS # BLD AUTO: 6 X10E3/UL (ref 1.4–7)
NEUTROPHILS NFR BLD AUTO: 70 %
PLATELET # BLD AUTO: 323 X10E3/UL (ref 150–450)
RBC # BLD AUTO: 5.16 X10E6/UL (ref 3.77–5.28)
RH BLD: POSITIVE
RUBV IGG SERPL IA-ACNC: 1.83 INDEX
VZV IGG SER IA-ACNC: 345 INDEX
WBC # BLD AUTO: 8.5 X10E3/UL (ref 3.4–10.8)

## 2024-09-13 RX ORDER — FERROUS SULFATE 325(65) MG
325 TABLET ORAL DAILY
Qty: 30 TABLET | Refills: 12 | Status: SHIPPED | OUTPATIENT
Start: 2024-09-13

## 2024-09-14 LAB — TREPONEMA PALLIDUM IGG+IGM AB [PRESENCE] IN SERUM OR PLASMA BY IMMUNOASSAY: NON REACTIVE

## 2024-09-16 LAB
., LABCORP: NORMAL
C TRACH RRNA SPEC QL NAA+PROBE: NEGATIVE
CYTOLOGIST CVX/VAG CYTO: NORMAL
CYTOLOGY CVX/VAG DOC CYTO: NORMAL
CYTOLOGY CVX/VAG DOC THIN PREP: NORMAL
DX ICD CODE: NORMAL
Lab: NORMAL
N GONORRHOEA RRNA SPEC QL NAA+PROBE: NEGATIVE
OTHER STN SPEC: NORMAL
SPECIMEN STATUS REPORT: NORMAL
STAT OF ADQ CVX/VAG CYTO-IMP: NORMAL
T VAGINALIS RRNA SPEC QL NAA+PROBE: NEGATIVE

## 2024-09-26 ENCOUNTER — OFFICE VISIT (OUTPATIENT)
Age: 19
End: 2024-09-26
Payer: MEDICAID

## 2024-09-26 VITALS
OXYGEN SATURATION: 98 % | RESPIRATION RATE: 18 BRPM | SYSTOLIC BLOOD PRESSURE: 98 MMHG | DIASTOLIC BLOOD PRESSURE: 62 MMHG | WEIGHT: 129.2 LBS | HEART RATE: 89 BPM | TEMPERATURE: 98.5 F | BODY MASS INDEX: 25.36 KG/M2 | HEIGHT: 60 IN

## 2024-09-26 DIAGNOSIS — M54.50 ACUTE LEFT-SIDED LOW BACK PAIN WITHOUT SCIATICA: ICD-10-CM

## 2024-09-26 DIAGNOSIS — Z23 ENCOUNTER FOR IMMUNIZATION: ICD-10-CM

## 2024-09-26 DIAGNOSIS — Z00.00 WELL WOMAN EXAM WITHOUT GYNECOLOGICAL EXAM: Primary | ICD-10-CM

## 2024-09-26 PROCEDURE — 99395 PREV VISIT EST AGE 18-39: CPT

## 2024-09-26 PROCEDURE — 90661 CCIIV3 VAC ABX FR 0.5 ML IM: CPT

## 2024-09-26 SDOH — ECONOMIC STABILITY: INCOME INSECURITY: HOW HARD IS IT FOR YOU TO PAY FOR THE VERY BASICS LIKE FOOD, HOUSING, MEDICAL CARE, AND HEATING?: NOT VERY HARD

## 2024-09-26 SDOH — ECONOMIC STABILITY: FOOD INSECURITY: WITHIN THE PAST 12 MONTHS, YOU WORRIED THAT YOUR FOOD WOULD RUN OUT BEFORE YOU GOT MONEY TO BUY MORE.: NEVER TRUE

## 2024-09-26 SDOH — ECONOMIC STABILITY: FOOD INSECURITY: WITHIN THE PAST 12 MONTHS, THE FOOD YOU BOUGHT JUST DIDN'T LAST AND YOU DIDN'T HAVE MONEY TO GET MORE.: NEVER TRUE

## 2024-09-26 ASSESSMENT — PATIENT HEALTH QUESTIONNAIRE - PHQ9
SUM OF ALL RESPONSES TO PHQ9 QUESTIONS 1 & 2: 0
SUM OF ALL RESPONSES TO PHQ QUESTIONS 1-9: 0
2. FEELING DOWN, DEPRESSED OR HOPELESS: NOT AT ALL
1. LITTLE INTEREST OR PLEASURE IN DOING THINGS: NOT AT ALL

## 2024-10-04 ENCOUNTER — ROUTINE PRENATAL (OUTPATIENT)
Age: 19
End: 2024-10-04

## 2024-10-04 VITALS
BODY MASS INDEX: 24.41 KG/M2 | WEIGHT: 125 LBS | DIASTOLIC BLOOD PRESSURE: 80 MMHG | SYSTOLIC BLOOD PRESSURE: 123 MMHG | HEART RATE: 102 BPM

## 2024-10-04 DIAGNOSIS — Z34.90 PREGNANCY, UNSPECIFIED GESTATIONAL AGE: Primary | ICD-10-CM

## 2024-10-04 NOTE — PROGRESS NOTES
No c/o.  Panorama/Horizon today.  EWA 4wks with AFP, will est with another MD.      - U=D. 9+5 @ 9+2.  LMPV for datingL 7/9/24 > CHINEDU=4/15/25  - h/o syncopal episodes, etiology unknown, cleared by neuro and cards  - thinks she rec'd varicella vaccine, but not sure > will draw VZ IgG today  EWA 3wks with panorama/horizon  - Hgb=14.2, ferritin=13 > iron once daily

## 2024-10-14 LAB
EGFR GENE MUT TESTED BLD/T: NEGATIVE
KRAS GENE MUT ANL BLD/T: NEGATIVE
Lab: ABNORMAL
Lab: POSITIVE
MICROARRAY PLATFORM: NEGATIVE
NTRA ALPHA-THALASSEMIA: NEGATIVE
NTRA BATTEN DISEASE (NEURONAL CEROID LIPOFUSCINOSIS, CLN3-RELATED): NEGATIVE
NTRA BETA-HEMOGLOBINOPATHIES: NEGATIVE
NTRA BLOOM SYNDROME: NEGATIVE
NTRA CANAVAN DISEASE: NEGATIVE
NTRA CITRULLINEMIA, TYPE I: NEGATIVE
NTRA CYSTIC FIBROSIS: NEGATIVE
NTRA DUCHENNE/BECKER MUSCULAR DYSTROPHY: NEGATIVE
NTRA FAMILIAL DYSAUTONOMIA: NEGATIVE
NTRA FANCONI ANEMIA, GROUP C: NEGATIVE
NTRA FRAGILE X SYNDROME: NEGATIVE
NTRA GALACTOSEMIA: NEGATIVE
NTRA GAUCHER DISEASE: NEGATIVE
NTRA GLYCOGEN STORAGE DISEASE, TYPE 1A: NEGATIVE
NTRA ISOVALERIC ACIDEMIA: NEGATIVE
NTRA MEDIUM CHAIN ACYL-COA DEHYDROGENASE DEFICIENCY: NEGATIVE
NTRA METHYLMALONIC ACIDURIA AND HOMOCYSTINURIA, TYPE CBLC: NEGATIVE
NTRA MUCOLIPIDOSIS, TYPE IV: NEGATIVE
NTRA POLYCYSTIC KIDNEY DISEASE, AUTOSOMAL RECESSIVE: NEGATIVE
NTRA SMITH-LEMLI-OPITZ SYNDROME: NEGATIVE
NTRA SPINAL MUSCULAR ATROPHY: POSITIVE
NTRA TAY-SACHS DISEASE: NEGATIVE
NTRA TYROSINEMIA, TYPE I: NEGATIVE
NTRA ZELLWEGER SPECTRUM DISORDERS, PEX1-RELATED: NEGATIVE

## 2024-10-14 NOTE — PROGRESS NOTES
Antwon Melchor [facility]  Routine Prenatal Visit        19 y.o.  at 13w6d by LMP cw 9 w US here for EWA. Doing well, no complaints. -CTX, +FM, -vaginal bleeding or discharge, -PreE sxs including HA, Vision changes CP/SOB, RUQ pain, or RENAN.      BP Readings from Last 3 Encounters:   10/04/24 123/80   24 98/62   24 124/79       Wt Readings from Last 10 Encounters:   10/04/24 56.7 kg (125 lb)   24 58.6 kg (129 lb 3.2 oz)   24 56.2 kg (124 lb)   24 58.1 kg (128 lb)   24 54.4 kg (120 lb)   23 58.5 kg (129 lb) (53%, Z= 0.06)*   23 60.8 kg (134 lb) (61%, Z= 0.29)*   23 58.5 kg (129 lb) (53%, Z= 0.07)*   23 57.8 kg (127 lb 6.4 oz) (50%, Z= 0.01)*   22 62 kg (136 lb 9.6 oz) (66%, Z= 0.41)*     * Growth percentiles are based on CDC (Girls, 2-20 Years) data.         FH: NA  FHT: 136       19 y.o.  at 14w1d by L/9.     #EWA:   - Labs reviewed - hgb 14.1; Rh+ and ab screen neg; HIV, syphilis NR, GC/Chlaymydia neg; Hep C neg; Hep B Ag neg, and Hep B immunity unknown; rubella and VZV immune;   early GTT NI; GTT @ 24-28wks; TSH NI; Ucx NG; NIPT LR and Carrier Screening Positive for Spinal Muscle  carrier; Hemoglobin evaluation wnl. GBS to be collected within 5 weeks of anticipated delivery.  - Last Pap , NILM  - Sonos reviewed.   - Immunizations: flu 2024; Tdap at 28 weeks; Hep B immunity unknown  - Indications for Aspirin: SES, G1. To start today  - Taking PNV.  - Contraception plan: TBD  - Anticipated mode of delivery: vaginal    # SMA Carrier  - to see Genetics at [facility] appt.      Today: scheduled MFM sono, start ASA    Next Visit:         Follow up in 4 weeks.  Next appointment scheduled Visit date not found    Deniz Melo MD, MPH  [facility]

## 2024-10-16 ENCOUNTER — ROUTINE PRENATAL (OUTPATIENT)
Age: 19
End: 2024-10-16

## 2024-10-16 VITALS
OXYGEN SATURATION: 98 % | SYSTOLIC BLOOD PRESSURE: 103 MMHG | TEMPERATURE: 98.3 F | DIASTOLIC BLOOD PRESSURE: 66 MMHG | WEIGHT: 129.8 LBS | BODY MASS INDEX: 25.48 KG/M2 | HEART RATE: 82 BPM | HEIGHT: 60 IN

## 2024-10-16 DIAGNOSIS — Z14.8 CARRIER OF SPINAL MUSCULAR ATROPHY: ICD-10-CM

## 2024-10-16 DIAGNOSIS — O09.92 SUPERVISION OF HIGH-RISK PREGNANCY, SECOND TRIMESTER: Primary | ICD-10-CM

## 2024-10-16 PROCEDURE — 0502F SUBSEQUENT PRENATAL CARE: CPT | Performed by: STUDENT IN AN ORGANIZED HEALTH CARE EDUCATION/TRAINING PROGRAM

## 2024-10-16 RX ORDER — ASPIRIN 81 MG/1
81 TABLET ORAL DAILY
Qty: 90 TABLET | Refills: 1 | Status: SHIPPED | OUTPATIENT
Start: 2024-10-16

## 2024-10-16 NOTE — PROGRESS NOTES
Esperanza Martinez is a 19 y.o. female      Chief Complaint   Patient presents with    Routine Prenatal Visit     14w 1d  No vaginal bleeding or abnormal discharge. No contractions but has a discomfort of lower abdomen that comes and goes.  Already had flu shot         \"Have you been to the ER, urgent care clinic since your last visit?  Hospitalized since your last visit?\"    NO    “Have you seen or consulted any other health care providers outside of [facility] since your last visit?”    NO            Click Here for Release of Records Request    Vitals:    10/16/24 1603   Weight: 58.9 kg (129 lb 12.8 oz)   Height: 1.524 m (5')           Medication Reconciliation Completed, changes notes. Please Update medication list.

## 2024-10-18 ENCOUNTER — ROUTINE PRENATAL (OUTPATIENT)
Age: 19
End: 2024-10-18
Payer: MEDICAID

## 2024-10-18 VITALS
WEIGHT: 129.41 LBS | DIASTOLIC BLOOD PRESSURE: 72 MMHG | OXYGEN SATURATION: 99 % | BODY MASS INDEX: 24.43 KG/M2 | RESPIRATION RATE: 18 BRPM | TEMPERATURE: 98.2 F | HEART RATE: 94 BPM | HEIGHT: 61 IN | SYSTOLIC BLOOD PRESSURE: 111 MMHG

## 2024-10-18 DIAGNOSIS — Z3A.14 14 WEEKS GESTATION OF PREGNANCY: ICD-10-CM

## 2024-10-18 DIAGNOSIS — N30.00 ACUTE CYSTITIS WITHOUT HEMATURIA: Primary | ICD-10-CM

## 2024-10-18 DIAGNOSIS — R10.2 SUPRAPUBIC PAIN: ICD-10-CM

## 2024-10-18 LAB
BILIRUBIN, URINE, POC: NEGATIVE
BLOOD URINE, POC: NEGATIVE
GLUCOSE URINE, POC: NEGATIVE
KETONES, URINE, POC: NEGATIVE
LEUKOCYTE ESTERASE, URINE, POC: NORMAL
NITRITE, URINE, POC: NEGATIVE
PH, URINE, POC: 6.5 (ref 4.6–8)
PROTEIN,URINE, POC: NEGATIVE
SPECIFIC GRAVITY, URINE, POC: 1.02 (ref 1–1.03)
URINALYSIS CLARITY, POC: NORMAL
URINALYSIS COLOR, POC: YELLOW
UROBILINOGEN, POC: NORMAL

## 2024-10-18 PROCEDURE — 81003 URINALYSIS AUTO W/O SCOPE: CPT

## 2024-10-18 PROCEDURE — 99213 OFFICE O/P EST LOW 20 MIN: CPT

## 2024-10-18 RX ORDER — NITROFURANTOIN 25; 75 MG/1; MG/1
100 CAPSULE ORAL 2 TIMES DAILY
Qty: 20 CAPSULE | Refills: 0 | Status: SHIPPED | OUTPATIENT
Start: 2024-10-18 | End: 2024-10-28

## 2024-10-18 NOTE — PROGRESS NOTES
Pt is 14wks pregnant, she has been feeling abdominal pressure since Monday night.    Patient is a  at 14w3d    Leakage of Fluid: NO  Vaginal Bleeding: NO  Fetal Movement if > 20 weeks: NO  Prenatal vitamins: YES  Having Contractions: NO  Pain: Lower Abdominal pressure         Identified pt with two pt identifiers(name and ). Reviewed record in preparation for visit and have obtained necessary documentation.  Chief Complaint   Patient presents with    Abdominal Pain     Lower Abdominal pressure         Vitals:    10/18/24 1408   BP: 111/72   Site: Right Upper Arm   Position: Sitting   Cuff Size: Medium Adult   Pulse: 94   Resp: 18   Temp: 98.2 °F (36.8 °C)   TempSrc: Oral   SpO2: 99%   Weight: 58.7 kg (129 lb 6.6 oz)   Height: 1.549 m (5' 1\")         Coordination of Care Questionnaire:  :     \"Have you been to the ER, urgent care clinic since your last visit?  Hospitalized since your last visit?\"    NO    “Have you seen or consulted any other health care providers outside of [facility] since your last visit?”    NO            Click Here for Release of Records Request

## 2024-10-18 NOTE — PROGRESS NOTES
Return OB Visit       Subjective:   Esperanza Martinez 19 y.o.   CHINEDU: 4/15/2025, by Last Menstrual Period  GA:  14w3d.      LOF: No  Vaginal bleeding: No  Fetal movement (after 20 weeks): NA  Contractions: No  Prenatal vitamins: Yes    Lower Abdominal Pain:  Symptoms starting about 5 days ago. Described as lower abdominal pressure. Worse when sitting. Lower to the right.   Denies dysuria, urine odor, no hematuria.   Daily BM, without issue      Pt denies fever, chills, HA, vision disturbances, RUQ pain, chest pain, SOB, N/V, urinary problems, foul smelling vaginal discharge.    Allergies- reviewed:   No Known Allergies  Medications- reviewed:   Current Outpatient Medications   Medication Sig    nitrofurantoin, macrocrystal-monohydrate, (MACROBID) 100 MG capsule Take 1 capsule by mouth 2 times daily for 10 days    aspirin 81 MG EC tablet Take 1 tablet by mouth daily    ferrous sulfate (IRON 325) 325 (65 Fe) MG tablet Take 1 tablet by mouth daily    Prenatal MV-Min-Fe Cbn-FA-DHA (PRENATAL PLUS DHA PO) Take 1 tablet by mouth daily     No current facility-administered medications for this visit.     Past Medical History- reviewed:  Past Medical History:   Diagnosis Date    Contact dermatitis and eczema due to cause     Comes and goes. Since a child; hasn't had it for the last 2-3 years    Fainting spell     has been told fainting spells or seizures lasts for 25 sec. (once a month); last episode about 2 years ago. Has had a 30 day heart monitor which was normal. Also has had a head CT and EEG were normal.    Headache     Resolved now    Palpitations     last episode last year; has seen cardiology and has had a heart monitor which showed normal results    Routine Papanicolaou smear 2024    normal     Past Surgical History- reviewed:   Past Surgical History:   Procedure Laterality Date    TONSILLECTOMY  2022    WISDOM TOOTH EXTRACTION  2020     Social History- reviewed:  Social History     Socioeconomic

## 2024-10-24 NOTE — PROGRESS NOTES
I reviewed the patient's medical history, the resident's findings on physical examination, the patient's diagnoses, and treatment plan as documented in the resident note.  I concur with the treatment plan as documented.

## 2024-11-13 ENCOUNTER — ROUTINE PRENATAL (OUTPATIENT)
Age: 19
End: 2024-11-13
Payer: MEDICAID

## 2024-11-13 ENCOUNTER — ROUTINE PRENATAL (OUTPATIENT)
Age: 19
End: 2024-11-13

## 2024-11-13 VITALS — DIASTOLIC BLOOD PRESSURE: 73 MMHG | SYSTOLIC BLOOD PRESSURE: 118 MMHG | HEART RATE: 101 BPM

## 2024-11-13 DIAGNOSIS — Z14.8 CARRIER OF SPINAL MUSCULAR ATROPHY: Primary | ICD-10-CM

## 2024-11-13 DIAGNOSIS — Z3A.18 18 WEEKS GESTATION OF PREGNANCY: ICD-10-CM

## 2024-11-13 DIAGNOSIS — Z3A.18 18 WEEKS GESTATION OF PREGNANCY: Primary | ICD-10-CM

## 2024-11-13 PROCEDURE — 76811 OB US DETAILED SNGL FETUS: CPT | Performed by: STUDENT IN AN ORGANIZED HEALTH CARE EDUCATION/TRAINING PROGRAM

## 2024-11-13 PROCEDURE — 99204 OFFICE O/P NEW MOD 45 MIN: CPT | Performed by: STUDENT IN AN ORGANIZED HEALTH CARE EDUCATION/TRAINING PROGRAM

## 2024-11-13 NOTE — PROCEDURES
PATIENT: HUI GALAVIZ   -  : 2005   -  DOS:2024   -  INTERPRETING PROVIDER:Susie Rodriguez,   Indication  ========    SMA carrier    Method  ======    Transabdominal and transvaginal ultrasound examination. View: Suboptimal view: limited by early gestational age and fetal position    Dating  ======    LMP on: 2024  GA by LMP 18 w + 1 d  CHINEDU by LMP: 4/15/2025  Previous Ultrasound on: 2024  Type of prior assessment: GA  GA at prior assessment date 9 w + 5 d  GA by previous U/S 18 w + 4 d  CHINEDU by previous Ultrasound: 2025  Ultrasound examination on: 2024  GA by U/S based upon: AC, BPD, Femur, HC  GA by U/S 19 w + 0 d  CHINEDU by U/S: 2025  Assigned: based on the LMP, selected on 2024  Assigned GA 18 w + 1 d  Assigned CHINEDU: 4/15/2025    Fetal Growth Overview  =================    Exam date        GA              BPD (mm)          HC (mm)              AC (mm)              FL (mm)             HL (mm)         EFW (g)  2024        18w 1d        43.6     89%        155.7    61%        138.7     82%        29.4    77%                             274    93%    Fetal Biometry  ============    Standard  BPD 43.6 mm 19w 1d 89% Hadlock  OFD 53.7 mm 19w 1d 88% Lesly  .7 mm 18w 4d 61% Hadlock  Cerebellum tr 19.6 mm 19w 0d 91% Hill  Nuchal fold 4.5 mm  .7 mm 19w 2d 82% Hadlock  Femur 29.4 mm 19w 0d 77% Hadlock   g 19w 0d 93% Hadlock  EFW (lb) 0 lb  EFW (oz) 10 oz  EFW by: Hadlock (BPD-HC-AC-FL)  Extended   5.4 mm  CM 4.2 mm  40% Nicolaides  Head / Face / Neck  Nasal bone: present  Other Structures   bpm    General Evaluation  ==============    Cardiac activity present.  bpm. Fetal movements: visualized. Presentation: Cephalic  Placenta: Placental site: left lateral, appropriate distance from the internal os  Umbilical cord: Cord vessels: 3 vessel cord. Insertion site: central  Amniotic fluid: Amount of AF: normal. MVP 5.3 cm    Fetal

## 2024-11-13 NOTE — PROGRESS NOTES
Patient was seen 11/13/2024      Please look under media to view full consult and ultrasound report in ViewPoint.         Susie Rodriguez MD   Maternal Fetal Medicine

## 2024-11-13 NOTE — PROGRESS NOTES
Esperanza Martinez, \"Abiodun Awad" is a 19 y.o. female seen on 24 in our [place] office for genetic counseling regarding her abnormal carrier screening results. Esperanza Martinez will be 19 at the Estimated Date of Delivery: 4/15/25; . Genetic counseling was performed in person today. The patient was accompanied to her appointment by her partner and father of the baby (FOB), Jamar.    Impression and Recommendations:    - The patient's carrier screening results indicate that she is a carrier of spinal muscular atrophy (SMA).  - SMA was reviewed with the patient, including the natural history, relevant genetic risks, and further genetic screening and testing options.  - The FOB is scheduled to have his blood drawn for SMA carrier screening later today with the couple's OB office.  - The patient DECLINED diagnostic amniocentesis at this time.  - An msAFP is recommended between 15 and 24 weeks gestation to screen for open neural tube defects in the current pregnancy.  - An ultrasound and MFM consult will be performed today by Dr. Susie Rodriguez MD. Please see her note for further details.    Family and pregnancy histories were taken. The following information was discussed with the patient:    Genetic Screening:    Delmi previously had a medium, pan-ethnic carrier screening panel for 27 conditions through [facility] [facility] laboratory ordered by her OB's office. Her results indicate that she is a carrier of SMA. Her results were negative, or normal, for the other 26 conditions screened. These otherwise negative results significantly reduce, but do not entirely eliminate, the chance that she is a carrier for these conditions and therefore the risk to have an affected child.    Spinal muscular atrophy (SMA) is a genetic disorder affecting the control of muscle movement, typically characterized by muscle weakness and atrophy resulting from progressive degeneration and irreversible loss of the anterior horn cells in the spinal cord

## 2024-12-11 ENCOUNTER — ROUTINE PRENATAL (OUTPATIENT)
Age: 19
End: 2024-12-11

## 2024-12-11 VITALS
TEMPERATURE: 98.3 F | HEART RATE: 90 BPM | DIASTOLIC BLOOD PRESSURE: 63 MMHG | SYSTOLIC BLOOD PRESSURE: 96 MMHG | HEIGHT: 61 IN | BODY MASS INDEX: 26.32 KG/M2 | OXYGEN SATURATION: 98 % | WEIGHT: 139.4 LBS

## 2024-12-11 DIAGNOSIS — O09.92 SUPERVISION OF HIGH-RISK PREGNANCY, SECOND TRIMESTER: Primary | ICD-10-CM

## 2024-12-11 DIAGNOSIS — N30.00 ACUTE CYSTITIS WITHOUT HEMATURIA: ICD-10-CM

## 2024-12-11 PROCEDURE — 0502F SUBSEQUENT PRENATAL CARE: CPT | Performed by: STUDENT IN AN ORGANIZED HEALTH CARE EDUCATION/TRAINING PROGRAM

## 2024-12-11 NOTE — PROGRESS NOTES
[place] Melchor [facility]  Routine Prenatal Visit        19 y.o.  at 22w1d by LMP cw 9 w US here for EWA. Doing well, no complaints. -CTX, +FM, -vaginal bleeding or discharge, -PreE sxs including HA, Vision changes CP/SOB, RUQ pain, or RENAN.      BP Readings from Last 3 Encounters:   24 118/73   10/18/24 111/72   10/16/24 103/66       Wt Readings from Last 10 Encounters:   10/18/24 58.7 kg (129 lb 6.6 oz)   10/16/24 58.9 kg (129 lb 12.8 oz)   10/04/24 56.7 kg (125 lb)   24 58.6 kg (129 lb 3.2 oz)   24 56.2 kg (124 lb)   24 58.1 kg (128 lb)   24 54.4 kg (120 lb)   23 58.5 kg (129 lb) (53%, Z= 0.06)*   23 60.8 kg (134 lb) (61%, Z= 0.29)*   23 58.5 kg (129 lb) (53%, Z= 0.07)*     * Growth percentiles are based on CDC (Girls, 2-20 Years) data.         FH: 20  FHT: 156       19 y.o.  at 22w1d  by L/9.     #EWA:   - Labs reviewed - hgb 14.1; Rh+ and ab screen neg; HIV, syphilis NR, GC/Chlaymydia neg; Hep C neg; Hep B Ag neg, and Hep B immunity unknown; rubella and VZV immune;   early GTT NI; GTT @ 24-28wks; TSH NI; Ucx NG; NIPT LR and Carrier Screening 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 GBS to be collected within 5 weeks of anticipated delivery.  - Last Pap , NILM  - Sonos reviewed.   - Immunizations: flu 2024; Tdap at 28 weeks; Hep B immunity unknown  - Indications for Aspirin: SES, G1.  - Taking PNV.  - Contraception plan: TBD  - Anticipated mode of delivery: vaginal    # SMA Carrier  - Saw GC  - FOB screening [facility] 02 (MRN:   <[id number]> ) ordered today.    #2nd tri UTI  - treated 10/24.  - YVONNE today    Today: Ucx, ordered FOB labs.    Next Visit: GTT, CBC        Follow up in 4 weeks.  Next appointment scheduled Visit date not found    Deniz Melo MD, MPH  [facility]

## 2024-12-11 NOTE — PROGRESS NOTES
Esperanza Martinez is a 19 y.o. female      Chief Complaint   Patient presents with    Routine Prenatal Visit     22w 1d .  No vaginal bleeding or abnormal discharge.  Positive fetal movement.  No contractions.  Partner info: Clemente Sarah 2/8/02       \"Have you been to the ER, urgent care clinic since your last visit?  Hospitalized since your last visit?\"    NO    “Have you seen or consulted any other health care providers outside of [facility] since your last visit?”    NO            Click Here for Release of Records Request    Vitals:    12/11/24 1429   BP: 96/63   Site: Right Upper Arm   Position: Sitting   Cuff Size: Medium Adult   Pulse: 90   Temp: 98.3 °F (36.8 °C)   TempSrc: Oral   SpO2: 98%   Weight: 63.2 kg (139 lb 6.4 oz)   Height: 1.549 m (5' 0.98\")           Medication Reconciliation Completed, changes notes. Please Update medication list.

## 2024-12-12 ENCOUNTER — PATIENT MESSAGE (OUTPATIENT)
Age: 19
End: 2024-12-12

## 2024-12-12 LAB — BACTERIA UR CULT: NO GROWTH

## 2024-12-13 ENCOUNTER — ROUTINE PRENATAL (OUTPATIENT)
Age: 19
End: 2024-12-13

## 2024-12-13 VITALS
TEMPERATURE: 99.1 F | HEIGHT: 61 IN | RESPIRATION RATE: 18 BRPM | WEIGHT: 141 LBS | BODY MASS INDEX: 26.62 KG/M2 | SYSTOLIC BLOOD PRESSURE: 102 MMHG | HEART RATE: 85 BPM | DIASTOLIC BLOOD PRESSURE: 69 MMHG | OXYGEN SATURATION: 99 %

## 2024-12-13 DIAGNOSIS — Z34.80 SUPERVISION OF OTHER NORMAL PREGNANCY, ANTEPARTUM: Primary | ICD-10-CM

## 2024-12-13 PROCEDURE — 0502F SUBSEQUENT PRENATAL CARE: CPT | Performed by: FAMILY MEDICINE

## 2024-12-13 ASSESSMENT — PATIENT HEALTH QUESTIONNAIRE - PHQ9
SUM OF ALL RESPONSES TO PHQ9 QUESTIONS 1 & 2: 0
SUM OF ALL RESPONSES TO PHQ QUESTIONS 1-9: 0
1. LITTLE INTEREST OR PLEASURE IN DOING THINGS: NOT AT ALL
SUM OF ALL RESPONSES TO PHQ QUESTIONS 1-9: 0
2. FEELING DOWN, DEPRESSED OR HOPELESS: NOT AT ALL

## 2024-12-13 NOTE — PROGRESS NOTES
Chief Complaint   Patient presents with    Routine Prenatal Visit     Patient is 22 weeks and 3 days. She is not having any vaginal bleeding or discharge. She is taking her prenatal vitamins. Patient stated the last time baby movement was noticed 4 days ago. No contractions. No other concerns.      Movements started around 20w, stopped feeling a few days ago     IUP: Rh pos  low risk NIPT, Nolan 27 neg except as noted  anatomy okay with limited views, f/up scheduled   Decreased Fetal Movement: counseled on normal fetal movements in pregnancy, visualized movement on BSUS today   SMA Carrier: saw GC, partner screen pending [id number]   Risk of PreE: has been counseled on ASA   Transfer of Care: from EWA  Hep B Unknown: titers next time labs     Estimated Date of Delivery: 4/15/25

## 2024-12-13 NOTE — PROGRESS NOTES
Esperanza Martinez is a 19 y.o. female      Chief Complaint   Patient presents with    Routine Prenatal Visit     Patient is 22 weeks and 3 days. She is not having any vaginal bleeding or discharge. She is taking her prenatal vitamins. Patient stated the last time baby movement was noticed 4 days ago. No contractions. No other concerns.        \"Have you been to the ER, urgent care clinic since your last visit?  Hospitalized since your last visit?\"    NO    “Have you seen or consulted any other health care providers outside of [facility] since your last visit?”    NO              Vitals:    12/13/24 0758   BP: 102/69   Site: Right Upper Arm   Position: Sitting   Pulse: 85   Resp: 18   Temp: 99.1 °F (37.3 °C)   TempSrc: Oral   SpO2: 99%   Weight: 64 kg (141 lb)   Height: 1.549 m (5' 0.98\")            Health Maintenance Due   Topic Date Due    Varicella vaccine (1 of 2 - 13+ 2-dose series) Never done    HPV vaccine (2 - 3-dose series) 03/26/2021    Hepatitis B vaccine (1 of 3 - 19+ 3-dose series) Never done    DTaP/Tdap/Td vaccine (1 - Tdap) Never done    COVID-19 Vaccine (1 - 2023-24 season) Never done         Medication Reconciliation completed, changes noted.  Please  Update medication list.

## 2024-12-27 ENCOUNTER — ROUTINE PRENATAL (OUTPATIENT)
Age: 19
End: 2024-12-27
Payer: MEDICAID

## 2024-12-27 VITALS — DIASTOLIC BLOOD PRESSURE: 78 MMHG | SYSTOLIC BLOOD PRESSURE: 122 MMHG | HEART RATE: 89 BPM

## 2024-12-27 DIAGNOSIS — Z3A.24 24 WEEKS GESTATION OF PREGNANCY: Primary | ICD-10-CM

## 2024-12-27 PROCEDURE — 76816 OB US FOLLOW-UP PER FETUS: CPT | Performed by: STUDENT IN AN ORGANIZED HEALTH CARE EDUCATION/TRAINING PROGRAM

## 2024-12-28 NOTE — PROGRESS NOTES
Please look under media to view full consult and ultrasound report in ViewPoint.     Esperanza Martinez , was evaluated through a synchronous (real-time) audio encounter.     The patient (or guardian if applicable) is aware that this is a billable service, which includes applicable co-pays. This Virtual Visit was conducted with patient's (and/or legal guardian's) consent. Patient identification was verified, and a caregiver was present when appropriate.     This patient was located at home [address]   [place] [address].     Provider was located at [facility].     Confirm you are appropriately licensed, registered, or certified to deliver care in the state where the patient is located as indicated above. If you are not or unsure, please re-schedule the visit: Yes, I confirm.    Susie Rodriguez MD   Maternal Fetal Medicine

## 2024-12-28 NOTE — PROCEDURES
normal within the limitations of ultrasound  technology.    The ultrasound findings as listed above and diagnostic limitations of ultrasound imaging, including inability to exclude all anomalies, have been reviewed with the patient. All  questions and concerns addressed.    Consultation  ==========    HUI is 19 yrs of age,  at 24w 3d.    Patient presents today for growth ultrasound/consultation. Today?s biometry is consistent with her dates. There is a normal amount of amniotic fluid. The ultrasound findings  were discussed with the patient.    Patient?s interval obstetrical history is reported as benign and uneventful. Patient denies any obstetrical complaints. Patient reports good fetal movements. Patient denies  regular/frequent contractions, vaginal bleeding or leakage of fluid.    SMA carrier  - sp GC visit, declined    A total of 35 minutes was spent on this visit reviewing previous notes, counseling the patient and documenting the findings in the note. All questions were answered  remotely and patient is in agreement with the plan of care.    Recommendations  ==============    No Maternal Fetal Medicine follow up is indicated. We will gladly see your patient as clinically indicated.    Coding  ======    Code: 08300  Description: Ultrasound, pregnant uterus, real time with image documentation, follow up, transabdominal approach per fetus

## 2025-01-10 ENCOUNTER — ROUTINE PRENATAL (OUTPATIENT)
Age: 20
End: 2025-01-10

## 2025-01-10 VITALS
BODY MASS INDEX: 26.92 KG/M2 | HEART RATE: 95 BPM | HEIGHT: 61 IN | SYSTOLIC BLOOD PRESSURE: 106 MMHG | WEIGHT: 142.6 LBS | DIASTOLIC BLOOD PRESSURE: 65 MMHG | TEMPERATURE: 98.5 F | OXYGEN SATURATION: 98 %

## 2025-01-10 DIAGNOSIS — O09.92 SUPERVISION OF HIGH-RISK PREGNANCY, SECOND TRIMESTER: Primary | ICD-10-CM

## 2025-01-10 NOTE — PROGRESS NOTES
Esperanza Martinez is a 19 y.o. female      Chief Complaint   Patient presents with    Routine Prenatal Visit     26w 3d .  vaginal bleeding or abnormal discharge.  Positive fetal movement.  No contractions.         \"Have you been to the ER, urgent care clinic since your last visit?  Hospitalized since your last visit?\"    NO    “Have you seen or consulted any other health care providers outside of [facility] since your last visit?”    NO            Click Here for Release of Records Request    Vitals:    01/10/25 0820   BP: 106/65   Site: Right Upper Arm   Position: Sitting   Cuff Size: Medium Adult   Pulse: 95   Temp: 98.5 °F (36.9 °C)   TempSrc: Oral   SpO2: 98%   Weight: 64.7 kg (142 lb 9.6 oz)   Height: 1.549 m (5' 0.98\")           Medication Reconciliation Completed, changes notes. Please Update medication list.

## 2025-01-10 NOTE — PROGRESS NOTES
Antwon Melchor [facility]  Routine Prenatal Visit        19 y.o.  at 26w3d by LMP cw 9 w US here for EWA. Doing well, no complaints. -CTX, +FM, -vaginal bleeding or discharge, -PreE sxs including HA, Vision changes CP/SOB, RUQ pain, or RENAN.      BP Readings from Last 3 Encounters:   01/10/25 106/65   24 122/78   24 102/69       Wt Readings from Last 10 Encounters:   01/10/25 64.7 kg (142 lb 9.6 oz)   24 64 kg (141 lb)   24 63.2 kg (139 lb 6.4 oz)   10/18/24 58.7 kg (129 lb 6.6 oz)   10/16/24 58.9 kg (129 lb 12.8 oz)   10/04/24 56.7 kg (125 lb)   24 58.6 kg (129 lb 3.2 oz)   24 56.2 kg (124 lb)   24 58.1 kg (128 lb)   24 54.4 kg (120 lb)         FH: 25  FHT: 155       19 y.o.  at 26w3d  by L/9.     #EWA:   - Labs reviewed - hgb 14.1; Rh+ and ab screen neg; HIV, syphilis NR, GC/Chlaymydia neg; Hep C neg; Hep B Ag neg, and Hep B immunity unknown; rubella and VZV immune;   early GTT NI; GTT @ 24-28wks; TSH NI; Ucx NG; NIPT LR and Carrier Screening 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 GBS to be collected within 5 weeks of anticipated delivery.  - Last Pap , NILM  - Sonos reviewed.   - Immunizations: flu 2024; Tdap at 28 weeks; Hep B immunity unknown  - Indications for Aspirin: SES, G1.  - Taking PNV.  - Contraception plan: TBD  - Anticipated mode of delivery: vaginal    # SMA Carrier  - Saw GC  - FOB screening [facility] 02 (MRN:   <[id number]> ) reviewed.    #2nd tri UTI  - treated 10/24.  - YVONNE neg    Today: CBC, 1hr GTT, HBV serologies    Next Visit: Tdap, STI labs.        Follow up in 4 weeks.  Next appointment scheduled Visit date not found    Deniz Melo MD, MPH  [facility]

## 2025-01-11 LAB
ERYTHROCYTE [DISTWIDTH] IN BLOOD BY AUTOMATED COUNT: 13.1 % (ref 11.7–15.4)
GLUCOSE 1H P 50 G GLC PO SERPL-MCNC: 111 MG/DL (ref 70–139)
HBV CORE AB SERPL QL IA: NEGATIVE
HBV SURFACE AB SER QL: NON REACTIVE
HCT VFR BLD AUTO: 38.6 % (ref 34–46.6)
HGB BLD-MCNC: 13 G/DL (ref 11.1–15.9)
MCH RBC QN AUTO: 32.4 PG (ref 26.6–33)
MCHC RBC AUTO-ENTMCNC: 33.7 G/DL (ref 31.5–35.7)
MCV RBC AUTO: 96 FL (ref 79–97)
PLATELET # BLD AUTO: 255 X10E3/UL (ref 150–450)
RBC # BLD AUTO: 4.01 X10E6/UL (ref 3.77–5.28)
WBC # BLD AUTO: 9.2 X10E3/UL (ref 3.4–10.8)

## 2025-01-20 ENCOUNTER — CLINICAL DOCUMENTATION (OUTPATIENT)
Age: 20
End: 2025-01-20

## 2025-02-04 ENCOUNTER — ROUTINE PRENATAL (OUTPATIENT)
Age: 20
End: 2025-02-04
Payer: MEDICAID

## 2025-02-04 VITALS
HEIGHT: 61 IN | OXYGEN SATURATION: 98 % | HEART RATE: 96 BPM | WEIGHT: 145 LBS | DIASTOLIC BLOOD PRESSURE: 62 MMHG | TEMPERATURE: 98.7 F | BODY MASS INDEX: 27.38 KG/M2 | SYSTOLIC BLOOD PRESSURE: 103 MMHG | RESPIRATION RATE: 18 BRPM

## 2025-02-04 DIAGNOSIS — O09.93 PREGNANCY, SUPERVISION, HIGH-RISK, THIRD TRIMESTER: Primary | ICD-10-CM

## 2025-02-04 DIAGNOSIS — Z23 ENCOUNTER FOR IMMUNIZATION: ICD-10-CM

## 2025-02-04 PROCEDURE — 90471 IMMUNIZATION ADMIN: CPT | Performed by: STUDENT IN AN ORGANIZED HEALTH CARE EDUCATION/TRAINING PROGRAM

## 2025-02-04 PROCEDURE — 0502F SUBSEQUENT PRENATAL CARE: CPT | Performed by: STUDENT IN AN ORGANIZED HEALTH CARE EDUCATION/TRAINING PROGRAM

## 2025-02-04 PROCEDURE — G0010 ADMIN HEPATITIS B VACCINE: HCPCS | Performed by: STUDENT IN AN ORGANIZED HEALTH CARE EDUCATION/TRAINING PROGRAM

## 2025-02-04 PROCEDURE — PBSHW TDAP, BOOSTRIX, (AGE 10 YRS+), IM: Performed by: STUDENT IN AN ORGANIZED HEALTH CARE EDUCATION/TRAINING PROGRAM

## 2025-02-04 PROCEDURE — PBSHW HEP B, ENGERIX-B, (AGE 20 YRS+), IM, 1ML, 3-DOSE: Performed by: STUDENT IN AN ORGANIZED HEALTH CARE EDUCATION/TRAINING PROGRAM

## 2025-02-04 ASSESSMENT — PATIENT HEALTH QUESTIONNAIRE - PHQ9
2. FEELING DOWN, DEPRESSED OR HOPELESS: NOT AT ALL
SUM OF ALL RESPONSES TO PHQ QUESTIONS 1-9: 0
SUM OF ALL RESPONSES TO PHQ9 QUESTIONS 1 & 2: 0
1. LITTLE INTEREST OR PLEASURE IN DOING THINGS: NOT AT ALL
SUM OF ALL RESPONSES TO PHQ QUESTIONS 1-9: 0

## 2025-02-04 NOTE — PROGRESS NOTES
Esperanza Martinez is a 19 y.o. female      Chief Complaint   Patient presents with    Routine Prenatal Visit     Patient is 30 weeks and 0 days. She is not having any vaginal bleeding or discharge. She is taking her prenatal vitamins. She is having fetal movement. No contractions. Her mother usually gets blood clots so she would like to discuss this with doctor. She states she gets a burning sensation in the back of her throat ans was told it was acid reflux. No other concerns.        \"Have you been to the ER, urgent care clinic since your last visit?  Hospitalized since your last visit?\"    NO    “Have you seen or consulted any other health care providers outside of [facility] since your last visit?”    NO              Vitals:    02/04/25 1319   BP: 103/62   Site: Right Upper Arm   Position: Sitting   Pulse: 96   Resp: 18   Temp: 98.7 °F (37.1 °C)   TempSrc: Oral   SpO2: 98%   Weight: 65.8 kg (145 lb)   Height: 1.549 m (5' 0.98\")            Health Maintenance Due   Topic Date Due    Varicella vaccine (1 of 2 - 13+ 2-dose series) Never done    HPV vaccine (2 - 3-dose series) 03/26/2021    Hepatitis B vaccine (1 of 3 - 19+ 3-dose series) Never done    DTaP/Tdap/Td vaccine (1 - Tdap) Never done    COVID-19 Vaccine (1 - 2023-24 season) Never done    Tdap Vaccine during Pregnancy  01/14/2025         Medication Reconciliation completed, changes noted.  Please  Update medication list.

## 2025-02-04 NOTE — PROGRESS NOTES
Antwon Melchor [facility]  Routine Prenatal Visit        19 y.o.  at 30w0d by LMP cw 9 w US here for EWA. Doing well, no complaints. -CTX, +FM, -vaginal bleeding or discharge, -PreE sxs including HA, Vision changes CP/SOB, RUQ pain, or RENAN.      BP Readings from Last 3 Encounters:   01/10/25 106/65   24 122/78   24 102/69       Wt Readings from Last 10 Encounters:   01/10/25 64.7 kg (142 lb 9.6 oz)   24 64 kg (141 lb)   24 63.2 kg (139 lb 6.4 oz)   10/18/24 58.7 kg (129 lb 6.6 oz)   10/16/24 58.9 kg (129 lb 12.8 oz)   10/04/24 56.7 kg (125 lb)   24 58.6 kg (129 lb 3.2 oz)   24 56.2 kg (124 lb)   24 58.1 kg (128 lb)   24 54.4 kg (120 lb)         FH: 25-->27  FHT: 153       19 y.o.  at 30w0d by L/9.     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 GBS to be collected within 5 weeks of anticipated delivery.  - Last Pap , NILM  - Sonos reviewed.   - Immunizations: flu 2024; Tdap at 28 weeks; Hep B immunity unknown  - Indications for Aspirin: SES, G1.  - Taking PNV.  - Contraception plan: TBD  - Anticipated mode of delivery: vaginal    # SMA Carrier  - Saw GC  - FOB screening [facility] 02 (MRN:   <[id number]> ) reviewed, wnl    #2nd tri UTI  - treated 10/24.  - YVONNE neg    Today: Tdap, HBV #1, STI labs.    Next Visit: 3T counseling, SW        Follow up in 2 weeks.  Next appointment scheduled Visit date not found    Deniz Melo MD, MPH  [facility]

## 2025-02-05 LAB — HIV 1+2 AB+HIV1 P24 AG SERPL QL IA: NON REACTIVE

## 2025-02-06 LAB — TREPONEMA PALLIDUM IGG+IGM AB [PRESENCE] IN SERUM OR PLASMA BY IMMUNOASSAY: NON REACTIVE

## 2025-02-26 ENCOUNTER — ROUTINE PRENATAL (OUTPATIENT)
Age: 20
End: 2025-02-26

## 2025-02-26 VITALS
DIASTOLIC BLOOD PRESSURE: 73 MMHG | OXYGEN SATURATION: 96 % | HEART RATE: 77 BPM | HEIGHT: 61 IN | BODY MASS INDEX: 27.98 KG/M2 | WEIGHT: 148.2 LBS | TEMPERATURE: 98.6 F | SYSTOLIC BLOOD PRESSURE: 110 MMHG

## 2025-02-26 DIAGNOSIS — O09.93 PREGNANCY, SUPERVISION, HIGH-RISK, THIRD TRIMESTER: Primary | ICD-10-CM

## 2025-02-26 PROCEDURE — 0502F SUBSEQUENT PRENATAL CARE: CPT | Performed by: STUDENT IN AN ORGANIZED HEALTH CARE EDUCATION/TRAINING PROGRAM

## 2025-02-26 NOTE — PROGRESS NOTES
Antwon Melchor [facility]  Routine Prenatal Visit        19 y.o.  at 33w1d by LMP cw 9 w US here for EWA. Doing well, no complaints. -CTX, +FM, -vaginal bleeding or discharge, -PreE sxs including HA, Vision changes CP/SOB, RUQ pain, or RENAN.      BP Readings from Last 3 Encounters:   25 103/62   01/10/25 106/65   24 122/78       Wt Readings from Last 10 Encounters:   25 65.8 kg (145 lb)   01/10/25 64.7 kg (142 lb 9.6 oz)   24 64 kg (141 lb)   24 63.2 kg (139 lb 6.4 oz)   10/18/24 58.7 kg (129 lb 6.6 oz)   10/16/24 58.9 kg (129 lb 12.8 oz)   10/04/24 56.7 kg (125 lb)   24 58.6 kg (129 lb 3.2 oz)   24 56.2 kg (124 lb)   24 58.1 kg (128 lb)         FH: 25-->27-->31  FHT: 140       19 y.o.  at 33w1d by L/9.     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 GBS to be collected within 5 weeks of anticipated delivery.  - Last Pap , NILM  - Sonos reviewed.   - Immunizations: flu 2024; Tdap ; Hep B   - Indications for Aspirin: SES, G1.  - Taking PNV.  - Contraception plan: TBD  - Anticipated mode of delivery: vaginal    # SMA Carrier  - Saw GC  - FOB screening [facility] 02 (MRN:   <[id number]> ) reviewed, wnl    #2nd tri UTI  - treated 10/24.  - YVONNE neg    #HBV NI  - vax 1     Third trimester counseling:  Epidural? Wants to avoid  Breastfeeding? Yes, has applied on her own.  Circumcision? No  Contraception plan? declines  Discussed directions to the hospital, LAD, and LAD precautions, including s/s of labor and SROM.      Today: 3T counseling    Next Visit:         Follow up in 2 weeks.  Next appointment scheduled Visit date not found    Deniz

## 2025-02-26 NOTE — PROGRESS NOTES
Esperanza Martinez is a 19 y.o. female      Chief Complaint   Patient presents with    Routine Prenatal Visit     33w 1d .  No vaginal bleeding or abnormal discharge.  Positive fetal movement.  No contractions.         \"Have you been to the ER, urgent care clinic since your last visit?  Hospitalized since your last visit?\"    NO    “Have you seen or consulted any other health care providers outside of [facility] since your last visit?”    NO            Click Here for Release of Records Request    Vitals:    02/26/25 1427   BP: 110/73   Site: Right Upper Arm   Position: Sitting   Cuff Size: Medium Adult   Pulse: 77   Temp: 98.6 °F (37 °C)   TempSrc: Oral   SpO2: 96%   Weight: 67.2 kg (148 lb 3.2 oz)   Height: 1.549 m (5' 0.98\")           Medication Reconciliation Completed, changes notes. Please Update medication list.

## 2025-03-12 ENCOUNTER — ROUTINE PRENATAL (OUTPATIENT)
Age: 20
End: 2025-03-12
Payer: MEDICAID

## 2025-03-12 ENCOUNTER — ANCILLARY PROCEDURE (OUTPATIENT)
Age: 20
End: 2025-03-12
Payer: MEDICAID

## 2025-03-12 VITALS
HEIGHT: 61 IN | OXYGEN SATURATION: 98 % | WEIGHT: 148.6 LBS | BODY MASS INDEX: 28.05 KG/M2 | TEMPERATURE: 97.9 F | HEART RATE: 73 BPM | DIASTOLIC BLOOD PRESSURE: 73 MMHG | SYSTOLIC BLOOD PRESSURE: 114 MMHG

## 2025-03-12 DIAGNOSIS — O26.843 FUNDAL HEIGHT LOW FOR DATES IN THIRD TRIMESTER: ICD-10-CM

## 2025-03-12 DIAGNOSIS — O09.93 PREGNANCY, SUPERVISION, HIGH-RISK, THIRD TRIMESTER: Primary | ICD-10-CM

## 2025-03-12 PROCEDURE — 0502F SUBSEQUENT PRENATAL CARE: CPT | Performed by: STUDENT IN AN ORGANIZED HEALTH CARE EDUCATION/TRAINING PROGRAM

## 2025-03-12 PROCEDURE — 76805 OB US >/= 14 WKS SNGL FETUS: CPT | Performed by: STUDENT IN AN ORGANIZED HEALTH CARE EDUCATION/TRAINING PROGRAM

## 2025-03-12 PROCEDURE — 90746 HEPB VACCINE 3 DOSE ADULT IM: CPT | Performed by: STUDENT IN AN ORGANIZED HEALTH CARE EDUCATION/TRAINING PROGRAM

## 2025-03-12 PROCEDURE — PBSHW HEP B, ENGERIX-B, (AGE 20 YRS+), IM, 1ML, 3-DOSE: Performed by: STUDENT IN AN ORGANIZED HEALTH CARE EDUCATION/TRAINING PROGRAM

## 2025-03-12 NOTE — PROGRESS NOTES
Antwon Melchor [facility]  Routine Prenatal Visit        19 y.o.  at 35w1d by LMP cw 9 w US here for WEA. Doing well, no complaints. -CTX, +FM, -vaginal bleeding or discharge, -PreE sxs including HA, Vision changes CP/SOB, RUQ pain, or RENAN.      BP Readings from Last 3 Encounters:   25 114/73   25 110/73   25 103/62       Wt Readings from Last 10 Encounters:   25 67.4 kg (148 lb 9.6 oz)   25 67.2 kg (148 lb 3.2 oz)   25 65.8 kg (145 lb)   01/10/25 64.7 kg (142 lb 9.6 oz)   24 64 kg (141 lb)   24 63.2 kg (139 lb 6.4 oz)   10/18/24 58.7 kg (129 lb 6.6 oz)   10/16/24 58.9 kg (129 lb 12.8 oz)   10/04/24 56.7 kg (125 lb)   24 58.6 kg (129 lb 3.2 oz)         FH: 25-->27-->31-->31  FHT: 135       19 y.o.  at 35w1d by L/9.     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 GBS to be collected within 5 weeks of anticipated delivery.  - Last Pap , NILM  - Sonos reviewed.   - Immunizations: flu 2024; Tdap ; Hep B   - Indications for Aspirin: SES, G1.  - Taking PNV.  - Contraception plan: TBD  - Anticipated mode of delivery: vaginal    #Size<dates  - 31cm today, unchanged from prior visit.  - POC growth US today shows EFW 12%ile/AC 11%ile.  - concern for decelerating growth. Scheduled for MFM sono w consultation tomorrow 3/13 at 8am.    # SMA Carrier  - Saw GC  - FOB screening [facility] 02 (MRN:   <[id number]> ) Not yet collected. Discussed today.      #2nd tri UTI  - treated 10/24.  - YVONNE neg    #HBV NI  - vax 1 , #2 today (3/12)    Third trimester counseling:  Epidural? Wants to avoid  Breastfeeding? Yes, has applied on her own.  Circumcision?

## 2025-03-12 NOTE — PROGRESS NOTES
Esperanza Martinez is a 19 y.o. female      Chief Complaint   Patient presents with    Routine Prenatal Visit     35w 1d .  No vaginal bleeding or abnormal discharge. Positive fetal movement.  No contractions.  148.6         \"Have you been to the ER, urgent care clinic since your last visit?  Hospitalized since your last visit?\"    NO    “Have you seen or consulted any other health care providers outside of [facility] since your last visit?”    NO            Click Here for Release of Records Request    Vitals:    03/12/25 1507   BP: 114/73   BP Site: Right Upper Arm   Patient Position: Sitting   BP Cuff Size: Medium Adult   Pulse: 73   Temp: 97.9 °F (36.6 °C)   TempSrc: Oral   SpO2: 98%   Weight: 67.4 kg (148 lb 9.6 oz)   Height: 1.549 m (5' 0.98\")           Medication Reconciliation Completed, changes notes. Please Update medication list.

## 2025-03-13 ENCOUNTER — ROUTINE PRENATAL (OUTPATIENT)
Age: 20
End: 2025-03-13
Payer: MEDICAID

## 2025-03-13 ENCOUNTER — RESULTS FOLLOW-UP (OUTPATIENT)
Age: 20
End: 2025-03-13

## 2025-03-13 VITALS — SYSTOLIC BLOOD PRESSURE: 105 MMHG | DIASTOLIC BLOOD PRESSURE: 68 MMHG | HEART RATE: 85 BPM

## 2025-03-13 DIAGNOSIS — Z34.90 PREGNANCY, UNSPECIFIED GESTATIONAL AGE: ICD-10-CM

## 2025-03-13 DIAGNOSIS — Z34.90 PREGNANCY, UNSPECIFIED GESTATIONAL AGE: Primary | ICD-10-CM

## 2025-03-13 PROCEDURE — 99203 OFFICE O/P NEW LOW 30 MIN: CPT | Performed by: OBSTETRICS & GYNECOLOGY

## 2025-03-13 PROCEDURE — 76816 OB US FOLLOW-UP PER FETUS: CPT | Performed by: OBSTETRICS & GYNECOLOGY

## 2025-03-13 PROCEDURE — 76820 UMBILICAL ARTERY ECHO: CPT | Performed by: OBSTETRICS & GYNECOLOGY

## 2025-03-13 PROCEDURE — 76818 FETAL BIOPHYS PROFILE W/NST: CPT | Performed by: OBSTETRICS & GYNECOLOGY

## 2025-03-13 SDOH — ECONOMIC STABILITY: FOOD INSECURITY: WITHIN THE PAST 12 MONTHS, YOU WORRIED THAT YOUR FOOD WOULD RUN OUT BEFORE YOU GOT MONEY TO BUY MORE.: NEVER TRUE

## 2025-03-13 SDOH — ECONOMIC STABILITY: FOOD INSECURITY: WITHIN THE PAST 12 MONTHS, THE FOOD YOU BOUGHT JUST DIDN'T LAST AND YOU DIDN'T HAVE MONEY TO GET MORE.: NEVER TRUE

## 2025-03-13 NOTE — PROGRESS NOTES
Patient was seen 3/13/2025      Please look under media to view full consult and ultrasound report in ViewPoint.       Joseph Conde MD  Maternal Fetal Medicine

## 2025-03-13 NOTE — RESULT ENCOUNTER NOTE
Reviewed MFM US confirmation of mild FGR, recommending delivery 38-39w GA. Scheduled for April 6-8 at 4pm. Called to discuss w pt but went to . Will send Mychart message and try to call again later if no response. Will need to discuss further at upcoimg OB appt.

## 2025-03-13 NOTE — PROCEDURES
PATIENT: HUI GALAVIZ   -  : 2005   -  DOS:2025   -  INTERPRETING PROVIDER:Joseph Conde,   Indication  ========    FGR, SMA carrier    Method  ======    Transabdominal ultrasound examination. View: Sufficient    Pregnancy  =========    Chou pregnancy. Number of fetuses: 1    Dating  ======    LMP on: 2024  GA by LMP 35 w + 2 d  CHINEDU by LMP: 4/15/2025  Previous Ultrasound on: 2024  Type of prior assessment: GA  GA at prior assessment date 9 w + 5 d  GA by previous U/S 35 w + 5 d  CHINEDU by previous Ultrasound: 2025  Ultrasound examination on: 3/13/2025  GA by U/S based upon: AC, BPD, Femur, HC  GA by U/S 33 w + 6 d  CHINEDU by U/S: 2025  Assigned: based on the LMP, selected on 2024  Assigned GA 35 w + 2 d  Assigned CHINEDU: 4/15/2025    Fetal Biometry  ============    Standard  BPD 84.8 mm 34w 1d 22% Hadlock  .6 mm 35w 4d 57% Lesly  .8 mm 34w 3d 7% Hadlock  .1 mm 32w 4d 3% Hadlock  Femur 66.8 mm 34w 3d 21% Hadlock  EFW 2,202 g 33w 1d 10% Hadlock  EFW (lb) 4 lb  EFW (oz) 14 oz  EFW by: Hadlock (BPD-HC-AC-FL)  Other Structures   bpm    General Evaluation  ==============    Cardiac activity present.  bpm. Fetal movements: visualized. Presentation: Cephalic  Placenta: Placental site: left lateral, appropriate distance from the internal os  Umbilical cord: Cord vessels: 3 vessel cord. Insertion site: central  Amniotic fluid: Amount of AF: normal. MVP 3.9 cm. EMRCY 13.1 cm. Q1 3.2 cm, Q2 3.7 cm, Q3 2.4 cm, Q4 3.9 cm    Fetal Anatomy  ===========    Stomach: normal  Kidneys: normal  Bladder: normal  Wants to know fetal sex: yes    Biophysical Profile  ==============    2: Fetal breathing movements  2: Gross body movements  2: Fetal tone  2: Amniotic fluid volume  NST: reactive  10/10 Biophysical profile score    Non Stress Test  =============    NST interpretation: reactive. Test duration 20 min. Baseline  bpm. Baseline variability: moderate.

## 2025-03-20 ENCOUNTER — ROUTINE PRENATAL (OUTPATIENT)
Age: 20
End: 2025-03-20
Payer: MEDICAID

## 2025-03-20 VITALS — HEART RATE: 97 BPM | SYSTOLIC BLOOD PRESSURE: 120 MMHG | DIASTOLIC BLOOD PRESSURE: 82 MMHG

## 2025-03-20 DIAGNOSIS — Z34.90 PREGNANCY, UNSPECIFIED GESTATIONAL AGE: ICD-10-CM

## 2025-03-20 PROCEDURE — 76820 UMBILICAL ARTERY ECHO: CPT | Performed by: STUDENT IN AN ORGANIZED HEALTH CARE EDUCATION/TRAINING PROGRAM

## 2025-03-20 PROCEDURE — 76818 FETAL BIOPHYS PROFILE W/NST: CPT | Performed by: STUDENT IN AN ORGANIZED HEALTH CARE EDUCATION/TRAINING PROGRAM

## 2025-03-20 PROCEDURE — 59025 FETAL NON-STRESS TEST: CPT | Performed by: STUDENT IN AN ORGANIZED HEALTH CARE EDUCATION/TRAINING PROGRAM

## 2025-03-20 PROCEDURE — 99214 OFFICE O/P EST MOD 30 MIN: CPT | Performed by: STUDENT IN AN ORGANIZED HEALTH CARE EDUCATION/TRAINING PROGRAM

## 2025-03-20 NOTE — PROGRESS NOTES
Please see ultrasound report under Imaging tab or Media tab.  Fabienne Abdullahi MD  [facility]

## 2025-03-20 NOTE — PROCEDURES
signs/sx of pre-eclampsia  -Reviewed that subsequent development of hypertensive disorder of pregnancy can be associated with IUGR  - Growth q3 weeks from diagnosis  - BPP + Doppler weekly from diagnosis  - Delivery at 38.0 to 39.0,    SMA carrier  - sp GC visit, declined    Recommendations  ==============    - Follow up growth in 1 week (2 weeks from diagnosis, as patient will be 37w and if severe measurements that indicated delivery)  - BPP + Doppler weekly from diagnosis  - Recommend delivery at 38.0 due to isolated FGR (borderline severe with AC 3%ile)    Coding  ======    Code: 06085  Description: Fetal biophysical profile; with non-stress testing  Code: 73901  Description: Doppler velocimetry, fetal; umbilical artery  Code: 05160  Description: Fetal non-stress test

## 2025-03-26 ENCOUNTER — ROUTINE PRENATAL (OUTPATIENT)
Age: 20
End: 2025-03-26

## 2025-03-26 ENCOUNTER — HOSPITAL ENCOUNTER (INPATIENT)
Facility: HOSPITAL | Age: 20
LOS: 4 days | Discharge: HOME | DRG: 560 | End: 2025-03-30
Attending: STUDENT IN AN ORGANIZED HEALTH CARE EDUCATION/TRAINING PROGRAM | Admitting: STUDENT IN AN ORGANIZED HEALTH CARE EDUCATION/TRAINING PROGRAM
Payer: MEDICAID

## 2025-03-26 VITALS
HEART RATE: 58 BPM | TEMPERATURE: 98.5 F | BODY MASS INDEX: 30.06 KG/M2 | SYSTOLIC BLOOD PRESSURE: 160 MMHG | DIASTOLIC BLOOD PRESSURE: 98 MMHG | HEIGHT: 61 IN | WEIGHT: 159.2 LBS

## 2025-03-26 DIAGNOSIS — O16.3 ELEVATED BLOOD PRESSURE AFFECTING PREGNANCY IN THIRD TRIMESTER, ANTEPARTUM: ICD-10-CM

## 2025-03-26 DIAGNOSIS — O09.93 PREGNANCY, SUPERVISION, HIGH-RISK, THIRD TRIMESTER: Primary | ICD-10-CM

## 2025-03-26 PROBLEM — O13.3 PIH (PREGNANCY INDUCED HYPERTENSION), THIRD TRIMESTER: Status: ACTIVE | Noted: 2025-03-26

## 2025-03-26 PROBLEM — O14.93 PRE-ECLAMPSIA IN THIRD TRIMESTER: Status: ACTIVE | Noted: 2025-03-26

## 2025-03-26 PROBLEM — O36.5990 SGA (SMALL FOR GESTATIONAL AGE), FETAL, AFFECTING CARE OF MOTHER, ANTEPARTUM: Status: ACTIVE | Noted: 2025-03-26

## 2025-03-26 PROBLEM — O14.13 SEVERE PRE-ECLAMPSIA IN THIRD TRIMESTER: Status: ACTIVE | Noted: 2025-03-26

## 2025-03-26 LAB
ABO + RH BLD: NORMAL
ALBUMIN SERPL-MCNC: 2.6 G/DL (ref 3.5–5)
ALBUMIN/GLOB SERPL: 0.7 (ref 1.1–2.2)
ALP SERPL-CCNC: 208 U/L (ref 45–117)
ALT SERPL-CCNC: 41 U/L (ref 12–78)
ANION GAP SERPL CALC-SCNC: 9 MMOL/L (ref 2–12)
AST SERPL-CCNC: 52 U/L (ref 15–37)
BILIRUB SERPL-MCNC: 1.2 MG/DL (ref 0.2–1)
BLOOD GROUP ANTIBODIES SERPL: NORMAL
BUN SERPL-MCNC: 9 MG/DL (ref 6–20)
BUN/CREAT SERPL: 17 (ref 12–20)
CALCIUM SERPL-MCNC: 8.9 MG/DL (ref 8.5–10.1)
CHLORIDE SERPL-SCNC: 107 MMOL/L (ref 97–108)
CO2 SERPL-SCNC: 22 MMOL/L (ref 21–32)
CREAT SERPL-MCNC: 0.54 MG/DL (ref 0.55–1.02)
CREAT UR-MCNC: 156 MG/DL
ERYTHROCYTE [DISTWIDTH] IN BLOOD BY AUTOMATED COUNT: 14 % (ref 11.5–14.5)
GLOBULIN SER CALC-MCNC: 3.6 G/DL (ref 2–4)
GLUCOSE SERPL-MCNC: 73 MG/DL (ref 65–100)
HCT VFR BLD AUTO: 37.2 % (ref 35–47)
HGB BLD-MCNC: 13.1 G/DL (ref 11.5–16)
MCH RBC QN AUTO: 31 PG (ref 26–34)
MCHC RBC AUTO-ENTMCNC: 35.2 G/DL (ref 30–36.5)
MCV RBC AUTO: 87.9 FL (ref 80–99)
NRBC # BLD: 0 K/UL (ref 0–0.01)
NRBC BLD-RTO: 0 PER 100 WBC
PLATELET # BLD AUTO: 182 K/UL (ref 150–400)
PMV BLD AUTO: 11 FL (ref 8.9–12.9)
POTASSIUM SERPL-SCNC: 4.4 MMOL/L (ref 3.5–5.1)
PROT SERPL-MCNC: 6.2 G/DL (ref 6.4–8.2)
PROT UR-MCNC: 40 MG/DL (ref 0–11.9)
PROT/CREAT UR-RTO: 0.3
RBC # BLD AUTO: 4.23 M/UL (ref 3.8–5.2)
SODIUM SERPL-SCNC: 138 MMOL/L (ref 136–145)
SPECIMEN EXP DATE BLD: NORMAL
WBC # BLD AUTO: 9.5 K/UL (ref 3.6–11)

## 2025-03-26 PROCEDURE — 3E033VJ INTRODUCTION OF OTHER HORMONE INTO PERIPHERAL VEIN, PERCUTANEOUS APPROACH: ICD-10-PCS

## 2025-03-26 PROCEDURE — 59200 INSERT CERVICAL DILATOR: CPT | Performed by: ADVANCED PRACTICE MIDWIFE

## 2025-03-26 PROCEDURE — 2580000003 HC RX 258: Performed by: STUDENT IN AN ORGANIZED HEALTH CARE EDUCATION/TRAINING PROGRAM

## 2025-03-26 PROCEDURE — 0502F SUBSEQUENT PRENATAL CARE: CPT | Performed by: STUDENT IN AN ORGANIZED HEALTH CARE EDUCATION/TRAINING PROGRAM

## 2025-03-26 PROCEDURE — 36415 COLL VENOUS BLD VENIPUNCTURE: CPT

## 2025-03-26 PROCEDURE — 82570 ASSAY OF URINE CREATININE: CPT

## 2025-03-26 PROCEDURE — 86901 BLOOD TYPING SEROLOGIC RH(D): CPT

## 2025-03-26 PROCEDURE — 6360000002 HC RX W HCPCS

## 2025-03-26 PROCEDURE — 84156 ASSAY OF PROTEIN URINE: CPT

## 2025-03-26 PROCEDURE — 6370000000 HC RX 637 (ALT 250 FOR IP)

## 2025-03-26 PROCEDURE — 80053 COMPREHEN METABOLIC PANEL: CPT

## 2025-03-26 PROCEDURE — 87081 CULTURE SCREEN ONLY: CPT

## 2025-03-26 PROCEDURE — 6360000002 HC RX W HCPCS: Performed by: STUDENT IN AN ORGANIZED HEALTH CARE EDUCATION/TRAINING PROGRAM

## 2025-03-26 PROCEDURE — 4A1HXCZ MONITORING OF PRODUCTS OF CONCEPTION, CARDIAC RATE, EXTERNAL APPROACH: ICD-10-PCS | Performed by: STUDENT IN AN ORGANIZED HEALTH CARE EDUCATION/TRAINING PROGRAM

## 2025-03-26 PROCEDURE — 86850 RBC ANTIBODY SCREEN: CPT

## 2025-03-26 PROCEDURE — 85027 COMPLETE CBC AUTOMATED: CPT

## 2025-03-26 PROCEDURE — G0379 DIRECT REFER HOSPITAL OBSERV: HCPCS

## 2025-03-26 PROCEDURE — G0378 HOSPITAL OBSERVATION PER HR: HCPCS

## 2025-03-26 PROCEDURE — 1100000000 HC RM PRIVATE

## 2025-03-26 PROCEDURE — 6370000000 HC RX 637 (ALT 250 FOR IP): Performed by: STUDENT IN AN ORGANIZED HEALTH CARE EDUCATION/TRAINING PROGRAM

## 2025-03-26 PROCEDURE — 3E0DXGC INTRODUCTION OF OTHER THERAPEUTIC SUBSTANCE INTO MOUTH AND PHARYNX, EXTERNAL APPROACH: ICD-10-PCS

## 2025-03-26 PROCEDURE — 86900 BLOOD TYPING SEROLOGIC ABO: CPT

## 2025-03-26 RX ORDER — NIFEDIPINE 30 MG/1
30 TABLET, EXTENDED RELEASE ORAL DAILY
Status: DISCONTINUED | OUTPATIENT
Start: 2025-03-26 | End: 2025-03-27

## 2025-03-26 RX ORDER — TRANEXAMIC ACID 10 MG/ML
1000 INJECTION, SOLUTION INTRAVENOUS
Status: COMPLETED | OUTPATIENT
Start: 2025-03-26 | End: 2025-03-28

## 2025-03-26 RX ORDER — LABETALOL HYDROCHLORIDE 5 MG/ML
INJECTION, SOLUTION INTRAVENOUS
Status: COMPLETED
Start: 2025-03-26 | End: 2025-03-26

## 2025-03-26 RX ORDER — SODIUM CHLORIDE, SODIUM LACTATE, POTASSIUM CHLORIDE, AND CALCIUM CHLORIDE .6; .31; .03; .02 G/100ML; G/100ML; G/100ML; G/100ML
500 INJECTION, SOLUTION INTRAVENOUS PRN
Status: DISCONTINUED | OUTPATIENT
Start: 2025-03-26 | End: 2025-03-30 | Stop reason: HOSPADM

## 2025-03-26 RX ORDER — LABETALOL HYDROCHLORIDE 5 MG/ML
20 INJECTION, SOLUTION INTRAVENOUS ONCE
Status: COMPLETED | OUTPATIENT
Start: 2025-03-26 | End: 2025-03-26

## 2025-03-26 RX ORDER — ACETAMINOPHEN 500 MG
1000 TABLET ORAL ONCE
Status: DISCONTINUED | OUTPATIENT
Start: 2025-03-26 | End: 2025-03-26

## 2025-03-26 RX ORDER — SODIUM CHLORIDE 0.9 % (FLUSH) 0.9 %
5-40 SYRINGE (ML) INJECTION PRN
Status: DISCONTINUED | OUTPATIENT
Start: 2025-03-26 | End: 2025-03-30 | Stop reason: HOSPADM

## 2025-03-26 RX ORDER — SODIUM CHLORIDE 0.9 % (FLUSH) 0.9 %
5-40 SYRINGE (ML) INJECTION EVERY 12 HOURS SCHEDULED
Status: DISCONTINUED | OUTPATIENT
Start: 2025-03-26 | End: 2025-03-30 | Stop reason: HOSPADM

## 2025-03-26 RX ORDER — LABETALOL HYDROCHLORIDE 5 MG/ML
10 INJECTION, SOLUTION INTRAVENOUS ONCE
Status: DISCONTINUED | OUTPATIENT
Start: 2025-03-26 | End: 2025-03-26

## 2025-03-26 RX ORDER — SODIUM CHLORIDE, SODIUM LACTATE, POTASSIUM CHLORIDE, CALCIUM CHLORIDE 600; 310; 30; 20 MG/100ML; MG/100ML; MG/100ML; MG/100ML
INJECTION, SOLUTION INTRAVENOUS CONTINUOUS
Status: DISCONTINUED | OUTPATIENT
Start: 2025-03-27 | End: 2025-03-30 | Stop reason: HOSPADM

## 2025-03-26 RX ORDER — MISOPROSTOL 200 UG/1
400 TABLET ORAL PRN
Status: DISCONTINUED | OUTPATIENT
Start: 2025-03-26 | End: 2025-03-30 | Stop reason: HOSPADM

## 2025-03-26 RX ORDER — MAGNESIUM SULFATE HEPTAHYDRATE 40 MG/ML
4000 INJECTION, SOLUTION INTRAVENOUS ONCE
Status: COMPLETED | OUTPATIENT
Start: 2025-03-26 | End: 2025-03-26

## 2025-03-26 RX ORDER — METHYLERGONOVINE MALEATE 0.2 MG/ML
200 INJECTION INTRAVENOUS PRN
Status: DISCONTINUED | OUTPATIENT
Start: 2025-03-26 | End: 2025-03-30 | Stop reason: HOSPADM

## 2025-03-26 RX ORDER — SODIUM CHLORIDE, SODIUM LACTATE, POTASSIUM CHLORIDE, CALCIUM CHLORIDE 600; 310; 30; 20 MG/100ML; MG/100ML; MG/100ML; MG/100ML
INJECTION, SOLUTION INTRAVENOUS CONTINUOUS
Status: DISCONTINUED | OUTPATIENT
Start: 2025-03-26 | End: 2025-03-30 | Stop reason: HOSPADM

## 2025-03-26 RX ORDER — ONDANSETRON 4 MG/1
4 TABLET, ORALLY DISINTEGRATING ORAL EVERY 6 HOURS PRN
Status: DISCONTINUED | OUTPATIENT
Start: 2025-03-26 | End: 2025-03-27 | Stop reason: SDUPTHER

## 2025-03-26 RX ORDER — TERBUTALINE SULFATE 1 MG/ML
0.25 INJECTION SUBCUTANEOUS
Status: DISCONTINUED | OUTPATIENT
Start: 2025-03-26 | End: 2025-03-30 | Stop reason: HOSPADM

## 2025-03-26 RX ORDER — SODIUM CHLORIDE 9 MG/ML
INJECTION, SOLUTION INTRAVENOUS PRN
Status: DISCONTINUED | OUTPATIENT
Start: 2025-03-26 | End: 2025-03-30 | Stop reason: HOSPADM

## 2025-03-26 RX ORDER — ONDANSETRON 4 MG/1
4 TABLET, ORALLY DISINTEGRATING ORAL EVERY 6 HOURS PRN
Status: DISCONTINUED | OUTPATIENT
Start: 2025-03-26 | End: 2025-03-30 | Stop reason: HOSPADM

## 2025-03-26 RX ORDER — ONDANSETRON 2 MG/ML
4 INJECTION INTRAMUSCULAR; INTRAVENOUS EVERY 6 HOURS PRN
Status: DISCONTINUED | OUTPATIENT
Start: 2025-03-26 | End: 2025-03-27 | Stop reason: SDUPTHER

## 2025-03-26 RX ORDER — ACETAMINOPHEN 325 MG/1
650 TABLET ORAL EVERY 4 HOURS PRN
Status: DISCONTINUED | OUTPATIENT
Start: 2025-03-26 | End: 2025-03-28

## 2025-03-26 RX ORDER — CARBOPROST TROMETHAMINE 250 UG/ML
250 INJECTION, SOLUTION INTRAMUSCULAR PRN
Status: DISCONTINUED | OUTPATIENT
Start: 2025-03-26 | End: 2025-03-30 | Stop reason: HOSPADM

## 2025-03-26 RX ORDER — CALCIUM GLUCONATE 94 MG/ML
1000 INJECTION, SOLUTION INTRAVENOUS PRN
Status: DISCONTINUED | OUTPATIENT
Start: 2025-03-26 | End: 2025-03-30 | Stop reason: HOSPADM

## 2025-03-26 RX ORDER — SODIUM CHLORIDE 9 MG/ML
25 INJECTION, SOLUTION INTRAVENOUS PRN
Status: DISCONTINUED | OUTPATIENT
Start: 2025-03-26 | End: 2025-03-26 | Stop reason: SDUPTHER

## 2025-03-26 RX ADMIN — SODIUM CHLORIDE, SODIUM LACTATE, POTASSIUM CHLORIDE, AND CALCIUM CHLORIDE: .6; .31; .03; .02 INJECTION, SOLUTION INTRAVENOUS at 17:23

## 2025-03-26 RX ADMIN — Medication 50 MCG: at 22:51

## 2025-03-26 RX ADMIN — NIFEDIPINE 30 MG: 30 TABLET, FILM COATED, EXTENDED RELEASE ORAL at 15:23

## 2025-03-26 RX ADMIN — ACETAMINOPHEN 650 MG: 325 TABLET ORAL at 23:49

## 2025-03-26 RX ADMIN — MAGNESIUM SULFATE HEPTAHYDRATE 4000 MG: 40 INJECTION, SOLUTION INTRAVENOUS at 17:26

## 2025-03-26 RX ADMIN — LABETALOL HYDROCHLORIDE 20 MG: 5 INJECTION, SOLUTION INTRAVENOUS at 16:28

## 2025-03-26 RX ADMIN — Medication 50 MCG: at 18:46

## 2025-03-26 RX ADMIN — MAGNESIUM SULFATE HEPTAHYDRATE 2000 MG/HR: 40 INJECTION, SOLUTION INTRAVENOUS at 17:47

## 2025-03-26 ASSESSMENT — PAIN DESCRIPTION - LOCATION: LOCATION: HEAD

## 2025-03-26 ASSESSMENT — PAIN DESCRIPTION - DESCRIPTORS: DESCRIPTORS: PRESSURE

## 2025-03-26 ASSESSMENT — PAIN DESCRIPTION - ORIENTATION: ORIENTATION: ANTERIOR

## 2025-03-26 ASSESSMENT — PAIN SCALES - GENERAL: PAINLEVEL_OUTOF10: 2

## 2025-03-26 NOTE — H&P
[id number] [id number]   Office [phone], Fax [phone]      History & Physical    Name: Esperanza Martinez MRN: [id number]  SSN: [id number]    YOB: 2005  Age: 19 y.o.  Sex: female      Subjective:     Reason for Admission:  Severe Range Pressures, HA    History of Present Illness: Ms. Martinez is a 19 y.o.   female with an estimated gestational age of 37w1d by LMP cw 9w US with Estimated Date of Delivery: 4/15/25. Patient presented to prenatal visit today with frontal HA without vision abnormality and severe range BP's: 149/91,160/98. Pregnancy has been complicated by IUGR formally diagnosed by MFM via US on 3/20. Patient denies any previous significant headaches nor blood pressure issues.    At time of eval, patient denies significant HA (reports it is about a 1 or 2 out of 10 now). She endorses worsening LE edema x4 days and some increased difficulty catching her breath over that time period.     She denies SOB, RUQ pain, dysuria, hematuria, n/v.    She has been taking ASA, PNV, and iron supplements. No further medications.     FHX:  Patient's mother is bedside and tells me she herself had a stillborn and hx of pre-E as well as a premature delivery.     OB History    Para Term  AB Living   1        SAB IAB Ectopic Molar Multiple Live Births              # Outcome Date GA Lbr Pablo/2nd Weight Sex Type Anes PTL Lv   1 Current              Past Medical History:   Diagnosis Date    Contact dermatitis and eczema due to cause     Comes and goes. Since a child; hasn't had it for the last 2-3 years    Fainting spell     has been told fainting spells or seizures lasts for 25 sec. (once a month); last episode about 2 years ago. Has had a 30 day heart monitor which was normal. Also has had a head CT and EEG were normal.    Headache     Resolved now    Palpitations     last episode last year; has seen cardiology and has had a heart monitor which showed normal results

## 2025-03-26 NOTE — PROGRESS NOTES
Esperanza Martinez is a 19 y.o. female      Chief Complaint   Patient presents with    Routine Prenatal Visit     37w 1d .  No vaginal bleeding or abnormal discharge.  Positive fetal movement.  No contractions.  159.2lb  Headaches, swollen feets, legs,hands and face for a few days.  \"Placenta tired\"        \"Have you been to the ER, urgent care clinic since your last visit?  Hospitalized since your last visit?\"    NO    “Have you seen or consulted any other health care providers outside of [facility] since your last visit?”    NO            Click Here for Release of Records Request    Vitals:    03/26/25 1322 03/26/25 1326   BP: (!) 149/91 (!) 160/98   BP Site: Right Upper Arm Left Upper Arm   Patient Position: Sitting Sitting   BP Cuff Size: Medium Adult Medium Adult   Pulse: 58    Temp: 98.5 °F (36.9 °C)    TempSrc: Oral    Weight: 72.2 kg (159 lb 3.2 oz)    Height: 1.549 m (5' 0.98\")            Medication Reconciliation Completed, changes notes. Please Update medication list.

## 2025-03-26 NOTE — PROGRESS NOTES
Antwon Melchor [facility]  Routine Prenatal Visit        19 y.o.  at 37w1d by LMP cw 9 w US here for EWA. Co worsening RUEDA for the last day or two. Also with new onset RENAN. -CTX, +FM, -vaginal bleeding or discharge, denies Vision changes CP/SOB, RUQ pain.      BP Readings from Last 3 Encounters:   25 120/82   25 105/68   25 114/73       Wt Readings from Last 10 Encounters:   25 67.4 kg (148 lb 9.6 oz)   25 67.2 kg (148 lb 3.2 oz)   25 65.8 kg (145 lb)   01/10/25 64.7 kg (142 lb 9.6 oz)   24 64 kg (141 lb)   24 63.2 kg (139 lb 6.4 oz)   10/18/24 58.7 kg (129 lb 6.6 oz)   10/16/24 58.9 kg (129 lb 12.8 oz)   10/04/24 56.7 kg (125 lb)   24 58.6 kg (129 lb 3.2 oz)         FH: 25-->27-->31-->31-->31  FHT: 145       19 y.o.  at 37w1d by L/9.     #Severe Range Blood Pressure  #Headache  #Lower extremity edema  - new finding today.  - Has not taken tylenol.  -No vision changes, SOB, or RUQ pain  - sent to LAD for likely IOL for PIH, pending sustained BP and labs. May require Magnesium if HA not resolved w tylenol, antihypertensives if BP's maintained >160/110.    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 GBS to be collected within 5 weeks of anticipated delivery.  - Last Pap , NILM  - Sonos reviewed.   - Immunizations: flu 2024; Tdap ; Hep B   - Indications for Aspirin: SES, G1.  - Taking PNV.  - Contraception plan: TBD  - Anticipated mode of delivery: vaginal    #FGR  - was [facility] to [facility] visit for S<D, found to have EFW 10%ile, AC 3%ile, nor UAD and BPP. Now following weekly.  - rec'd delivery 38-39. Scheduled  at 2pm.    # SMA

## 2025-03-27 ENCOUNTER — ANESTHESIA EVENT (OUTPATIENT)
Facility: HOSPITAL | Age: 20
End: 2025-03-27
Payer: MEDICAID

## 2025-03-27 ENCOUNTER — ANESTHESIA (OUTPATIENT)
Facility: HOSPITAL | Age: 20
End: 2025-03-27
Payer: MEDICAID

## 2025-03-27 LAB
ALBUMIN SERPL-MCNC: 2.9 G/DL (ref 3.5–5)
ALBUMIN/GLOB SERPL: 0.8 (ref 1.1–2.2)
ALP SERPL-CCNC: 219 U/L (ref 45–117)
ALT SERPL-CCNC: 43 U/L (ref 12–78)
ANION GAP SERPL CALC-SCNC: 7 MMOL/L (ref 2–12)
AST SERPL-CCNC: 34 U/L (ref 15–37)
BASOPHILS # BLD: 0.01 K/UL (ref 0–0.1)
BASOPHILS # BLD: NORMAL K/UL (ref 0–0.1)
BASOPHILS NFR BLD: 0.1 % (ref 0–1)
BASOPHILS NFR BLD: NORMAL % (ref 0–1)
BILIRUB SERPL-MCNC: 0.7 MG/DL (ref 0.2–1)
BUN SERPL-MCNC: 5 MG/DL (ref 6–20)
BUN/CREAT SERPL: 10 (ref 12–20)
CALCIUM SERPL-MCNC: 7.5 MG/DL (ref 8.5–10.1)
CHLORIDE SERPL-SCNC: 107 MMOL/L (ref 97–108)
CO2 SERPL-SCNC: 23 MMOL/L (ref 21–32)
CREAT SERPL-MCNC: 0.5 MG/DL (ref 0.55–1.02)
DIFFERENTIAL METHOD BLD: ABNORMAL
DIFFERENTIAL METHOD BLD: NORMAL
EOSINOPHIL # BLD: 0 K/UL (ref 0–0.4)
EOSINOPHIL # BLD: NORMAL K/UL (ref 0–0.4)
EOSINOPHIL NFR BLD: 0 % (ref 0–7)
EOSINOPHIL NFR BLD: NORMAL % (ref 0–7)
ERYTHROCYTE [DISTWIDTH] IN BLOOD BY AUTOMATED COUNT: 14.2 % (ref 11.5–14.5)
ERYTHROCYTE [DISTWIDTH] IN BLOOD BY AUTOMATED COUNT: NORMAL % (ref 11.5–14.5)
GLOBULIN SER CALC-MCNC: 3.6 G/DL (ref 2–4)
GLUCOSE SERPL-MCNC: 75 MG/DL (ref 65–100)
HCT VFR BLD AUTO: 41.9 % (ref 35–47)
HCT VFR BLD AUTO: NORMAL % (ref 35–47)
HGB BLD-MCNC: 14.8 G/DL (ref 11.5–16)
HGB BLD-MCNC: NORMAL G/DL (ref 11.5–16)
IMM GRANULOCYTES # BLD AUTO: 0.07 K/UL (ref 0–0.04)
IMM GRANULOCYTES # BLD AUTO: NORMAL K/UL (ref 0–0.04)
IMM GRANULOCYTES NFR BLD AUTO: 0.5 % (ref 0–0.5)
IMM GRANULOCYTES NFR BLD AUTO: NORMAL % (ref 0–0.5)
LYMPHOCYTES # BLD: 1.17 K/UL (ref 0.8–3.5)
LYMPHOCYTES # BLD: NORMAL K/UL (ref 0.8–3.5)
LYMPHOCYTES NFR BLD: 9 % (ref 12–49)
LYMPHOCYTES NFR BLD: NORMAL % (ref 12–49)
MAGNESIUM SERPL-MCNC: 5.8 MG/DL (ref 1.6–2.4)
MCH RBC QN AUTO: 31 PG (ref 26–34)
MCH RBC QN AUTO: NORMAL PG (ref 26–34)
MCHC RBC AUTO-ENTMCNC: 35.3 G/DL (ref 30–36.5)
MCHC RBC AUTO-ENTMCNC: NORMAL G/DL (ref 30–36.5)
MCV RBC AUTO: 87.7 FL (ref 80–99)
MCV RBC AUTO: NORMAL FL (ref 80–99)
MONOCYTES # BLD: 0.48 K/UL (ref 0–1)
MONOCYTES # BLD: NORMAL K/UL (ref 0–1)
MONOCYTES NFR BLD: 3.7 % (ref 5–13)
MONOCYTES NFR BLD: NORMAL % (ref 5–13)
NEUTS SEG # BLD: 11.31 K/UL (ref 1.8–8)
NEUTS SEG # BLD: NORMAL K/UL (ref 1.8–8)
NEUTS SEG NFR BLD: 86.7 % (ref 32–75)
NEUTS SEG NFR BLD: NORMAL % (ref 32–75)
NRBC # BLD: 0 K/UL (ref 0–0.01)
NRBC # BLD: NORMAL K/UL (ref 0–0.01)
NRBC BLD-RTO: 0 PER 100 WBC
NRBC BLD-RTO: NORMAL PER 100 WBC
PLATELET # BLD AUTO: 186 K/UL (ref 150–400)
PLATELET # BLD AUTO: NORMAL K/UL (ref 150–400)
PMV BLD AUTO: 10.7 FL (ref 8.9–12.9)
POTASSIUM SERPL-SCNC: 4 MMOL/L (ref 3.5–5.1)
PROT SERPL-MCNC: 6.5 G/DL (ref 6.4–8.2)
RBC # BLD AUTO: 4.78 M/UL (ref 3.8–5.2)
RBC # BLD AUTO: NORMAL M/UL (ref 3.8–5.2)
SODIUM SERPL-SCNC: 137 MMOL/L (ref 136–145)
WBC # BLD AUTO: 13 K/UL (ref 3.6–11)
WBC # BLD AUTO: NORMAL K/UL (ref 3.6–11)

## 2025-03-27 PROCEDURE — 2500000003 HC RX 250 WO HCPCS: Performed by: ANESTHESIOLOGY

## 2025-03-27 PROCEDURE — 6360000002 HC RX W HCPCS: Performed by: STUDENT IN AN ORGANIZED HEALTH CARE EDUCATION/TRAINING PROGRAM

## 2025-03-27 PROCEDURE — 85025 COMPLETE CBC W/AUTO DIFF WBC: CPT

## 2025-03-27 PROCEDURE — 94761 N-INVAS EAR/PLS OXIMETRY MLT: CPT

## 2025-03-27 PROCEDURE — 10907ZC DRAINAGE OF AMNIOTIC FLUID, THERAPEUTIC FROM PRODUCTS OF CONCEPTION, VIA NATURAL OR ARTIFICIAL OPENING: ICD-10-PCS | Performed by: STUDENT IN AN ORGANIZED HEALTH CARE EDUCATION/TRAINING PROGRAM

## 2025-03-27 PROCEDURE — 1100000000 HC RM PRIVATE

## 2025-03-27 PROCEDURE — 7210000100 HC LABOR FEE PER 1 HR: Performed by: ADVANCED PRACTICE MIDWIFE

## 2025-03-27 PROCEDURE — 10H07YZ INSERTION OF OTHER DEVICE INTO PRODUCTS OF CONCEPTION, VIA NATURAL OR ARTIFICIAL OPENING: ICD-10-PCS

## 2025-03-27 PROCEDURE — 2580000003 HC RX 258

## 2025-03-27 PROCEDURE — 51702 INSERT TEMP BLADDER CATH: CPT

## 2025-03-27 PROCEDURE — 6370000000 HC RX 637 (ALT 250 FOR IP): Performed by: STUDENT IN AN ORGANIZED HEALTH CARE EDUCATION/TRAINING PROGRAM

## 2025-03-27 PROCEDURE — 6360000002 HC RX W HCPCS: Performed by: ANESTHESIOLOGY

## 2025-03-27 PROCEDURE — 83735 ASSAY OF MAGNESIUM: CPT

## 2025-03-27 PROCEDURE — 80053 COMPREHEN METABOLIC PANEL: CPT

## 2025-03-27 PROCEDURE — 6360000002 HC RX W HCPCS

## 2025-03-27 PROCEDURE — 36415 COLL VENOUS BLD VENIPUNCTURE: CPT

## 2025-03-27 PROCEDURE — 3700000025 EPIDURAL BLOCK: Performed by: ANESTHESIOLOGY

## 2025-03-27 PROCEDURE — 6370000000 HC RX 637 (ALT 250 FOR IP)

## 2025-03-27 RX ORDER — BUPIVACAINE HYDROCHLORIDE 2.5 MG/ML
INJECTION, SOLUTION EPIDURAL; INFILTRATION; INTRACAUDAL; PERINEURAL
Status: DISCONTINUED | OUTPATIENT
Start: 2025-03-27 | End: 2025-03-28 | Stop reason: SDUPTHER

## 2025-03-27 RX ORDER — NIFEDIPINE 30 MG/1
30 TABLET, EXTENDED RELEASE ORAL 2 TIMES DAILY
Status: DISCONTINUED | OUTPATIENT
Start: 2025-03-27 | End: 2025-03-30 | Stop reason: HOSPADM

## 2025-03-27 RX ORDER — LIDOCAINE HCL/EPINEPHRINE/PF 2%-1:200K
VIAL (ML) INJECTION
Status: DISCONTINUED | OUTPATIENT
Start: 2025-03-27 | End: 2025-03-28 | Stop reason: SDUPTHER

## 2025-03-27 RX ORDER — FENTANYL/BUPIVACAINE/NS/PF 2-1250MCG
10 PLASTIC BAG, INJECTION (ML) INJECTION CONTINUOUS
Refills: 0 | Status: DISCONTINUED | OUTPATIENT
Start: 2025-03-27 | End: 2025-03-30 | Stop reason: HOSPADM

## 2025-03-27 RX ORDER — NALOXONE HYDROCHLORIDE 0.4 MG/ML
INJECTION, SOLUTION INTRAMUSCULAR; INTRAVENOUS; SUBCUTANEOUS PRN
Status: DISCONTINUED | OUTPATIENT
Start: 2025-03-27 | End: 2025-03-30 | Stop reason: HOSPADM

## 2025-03-27 RX ORDER — ONDANSETRON 2 MG/ML
4 INJECTION INTRAMUSCULAR; INTRAVENOUS EVERY 6 HOURS PRN
Status: DISCONTINUED | OUTPATIENT
Start: 2025-03-27 | End: 2025-03-30 | Stop reason: HOSPADM

## 2025-03-27 RX ADMIN — MAGNESIUM SULFATE HEPTAHYDRATE 2000 MG/HR: 40 INJECTION, SOLUTION INTRAVENOUS at 02:56

## 2025-03-27 RX ADMIN — SODIUM CHLORIDE, SODIUM LACTATE, POTASSIUM CHLORIDE, AND CALCIUM CHLORIDE: .6; .31; .03; .02 INJECTION, SOLUTION INTRAVENOUS at 00:46

## 2025-03-27 RX ADMIN — BUPIVACAINE HYDROCHLORIDE 5 MG: 2.5 INJECTION, SOLUTION EPIDURAL; INFILTRATION; INTRACAUDAL; PERINEURAL at 15:21

## 2025-03-27 RX ADMIN — Medication 50 MCG: at 03:59

## 2025-03-27 RX ADMIN — NIFEDIPINE 30 MG: 30 TABLET, FILM COATED, EXTENDED RELEASE ORAL at 09:01

## 2025-03-27 RX ADMIN — BUPIVACAINE HYDROCHLORIDE 5 MG: 2.5 INJECTION, SOLUTION EPIDURAL; INFILTRATION; INTRACAUDAL; PERINEURAL at 15:17

## 2025-03-27 RX ADMIN — MAGNESIUM SULFATE HEPTAHYDRATE 2000 MG/HR: 40 INJECTION, SOLUTION INTRAVENOUS at 23:21

## 2025-03-27 RX ADMIN — MAGNESIUM SULFATE HEPTAHYDRATE 2000 MG/HR: 40 INJECTION, SOLUTION INTRAVENOUS at 13:07

## 2025-03-27 RX ADMIN — LIDOCAINE HYDROCHLORIDE,EPINEPHRINE BITARTRATE 3 ML: 20; .005 INJECTION, SOLUTION EPIDURAL; INFILTRATION; INTRACAUDAL; PERINEURAL at 15:14

## 2025-03-27 RX ADMIN — OXYTOCIN 2 MILLI-UNITS/MIN: 10 INJECTION, SOLUTION INTRAMUSCULAR; INTRAVENOUS at 16:05

## 2025-03-27 RX ADMIN — Medication 10 ML/HR: at 16:37

## 2025-03-27 RX ADMIN — ACETAMINOPHEN 650 MG: 325 TABLET ORAL at 10:42

## 2025-03-27 RX ADMIN — OXYTOCIN 2 MILLI-UNITS/MIN: 10 INJECTION, SOLUTION INTRAMUSCULAR; INTRAVENOUS at 10:16

## 2025-03-27 ASSESSMENT — PAIN DESCRIPTION - LOCATION: LOCATION: HEAD

## 2025-03-27 ASSESSMENT — PAIN SCALES - GENERAL: PAINLEVEL_OUTOF10: 2

## 2025-03-27 ASSESSMENT — PAIN DESCRIPTION - DESCRIPTORS: DESCRIPTORS: DULL

## 2025-03-27 ASSESSMENT — PAIN DESCRIPTION - ORIENTATION: ORIENTATION: POSTERIOR

## 2025-03-27 NOTE — ANESTHESIA PRE PROCEDURE
height or weight on file to calculate BMI.    CBC:   Lab Results   Component Value Date/Time    WBC 13.0 03/27/2025 12:13 PM    RBC 4.78 03/27/2025 12:13 PM    HGB 14.8 03/27/2025 12:13 PM    HCT 41.9 03/27/2025 12:13 PM    MCV 87.7 03/27/2025 12:13 PM    RDW 14.2 03/27/2025 12:13 PM     03/27/2025 12:13 PM       CMP:   Lab Results   Component Value Date/Time     03/27/2025 12:13 PM    K 4.0 03/27/2025 12:13 PM     03/27/2025 12:13 PM    CO2 23 03/27/2025 12:13 PM    BUN 5 03/27/2025 12:13 PM    CREATININE 0.50 03/27/2025 12:13 PM    GFRAA >60 11/18/2021 03:05 PM    AGRATIO 1.6 01/31/2023 12:00 AM    LABGLOM >90 03/27/2025 12:13 PM    LABGLOM 104 01/31/2023 12:00 AM    GLUCOSE 75 03/27/2025 12:13 PM    CALCIUM 7.5 03/27/2025 12:13 PM    BILITOT 0.7 03/27/2025 12:13 PM    ALKPHOS 219 03/27/2025 12:13 PM    ALKPHOS 72 01/31/2023 12:00 AM    AST 34 03/27/2025 12:13 PM    ALT 43 03/27/2025 12:13 PM       POC Tests: No results for input(s): \"POCGLU\", \"POCNA\", \"POCK\", \"POCCL\", \"POCBUN\", \"POCHEMO\", \"POCHCT\" in the last 72 hours.    Coags: No results found for: \"PROTIME\", \"INR\", \"APTT\"    HCG (If Applicable):   Lab Results   Component Value Date    PREGTESTUR Positive (A) 09/01/2024    PREGSERUM Negative 02/20/2023        ABGs: No results found for: \"PHART\", \"PO2ART\", \"VOF3WCA\", \"BBO0AMO\", \"BEART\", \"S6UFBAGK\"     Type & Screen (If Applicable):  Lab Results   Component Value Date    ABORH A POSITIVE 03/26/2025    LABANTI NEG 03/26/2025       Drug/Infectious Status (If Applicable):  Lab Results   Component Value Date/Time    HEPCAB Non Reactive 09/12/2024 11:14 AM       COVID-19 Screening (If Applicable): No results found for: \"COVID19\"        Anesthesia Evaluation  Patient summary reviewed and Nursing notes reviewed  Airway: Mallampati: II  TM distance: >3 FB   Neck ROM: full  Mouth opening: > = 3 FB   Dental: normal exam         Pulmonary: breath sounds clear to auscultation

## 2025-03-27 NOTE — ANESTHESIA PROCEDURE NOTES
Epidural Block    Patient location during procedure: OB  Start time: 3/27/2025 3:04 PM  Reason for block: labor epidural  Staffing  Performed: anesthesiologist   Anesthesiologist: Wilfredo Odom MD  Performed by: Wilfredo Odom MD  Authorized by: Wilfredo Odom MD    Epidural  Patient position: sitting  Prep: ChloraPrep  Patient monitoring: cardiac monitor, continuous pulse ox and frequent blood pressure checks  Approach: midline  Location: L3-4  Injection technique: MOI saline  Provider prep: mask and sterile gloves  Needle  Needle type: Tuohy   Needle gauge: 17 G  Needle length: 6 in  Needle insertion depth: 8 cm  Catheter type: multi-orifice  Catheter size: 22 G  Catheter at skin depth: 13 cm  Test dose: negativeCatheter Secured: tape and tegaderm  Assessment  Sensory level: T6  Hemodynamics: stable  Attempts: 1  Outcomes: uncomplicated and patient tolerated procedure well  Preanesthetic Checklist  Completed: patient identified, IV checked, site marked, risks and benefits discussed, surgical/procedural consents, equipment checked, pre-op evaluation, timeout performed, anesthesia consent given, oxygen available, monitors applied/VS acknowledged, fire risk safety assessment completed and verbalized and blood product R/B/A discussed and consented

## 2025-03-28 PROCEDURE — 6370000000 HC RX 637 (ALT 250 FOR IP): Performed by: ADVANCED PRACTICE MIDWIFE

## 2025-03-28 PROCEDURE — 6370000000 HC RX 637 (ALT 250 FOR IP)

## 2025-03-28 PROCEDURE — 1100000000 HC RM PRIVATE

## 2025-03-28 PROCEDURE — 7210000100 HC LABOR FEE PER 1 HR: Performed by: ADVANCED PRACTICE MIDWIFE

## 2025-03-28 PROCEDURE — 7220000101 HC DELIVERY VAGINAL/SINGLE: Performed by: ADVANCED PRACTICE MIDWIFE

## 2025-03-28 PROCEDURE — 2500000003 HC RX 250 WO HCPCS: Performed by: ANESTHESIOLOGY

## 2025-03-28 PROCEDURE — 94761 N-INVAS EAR/PLS OXIMETRY MLT: CPT

## 2025-03-28 PROCEDURE — 2500000003 HC RX 250 WO HCPCS

## 2025-03-28 PROCEDURE — 6360000002 HC RX W HCPCS: Performed by: ADVANCED PRACTICE MIDWIFE

## 2025-03-28 PROCEDURE — 3700000156 HC EPIDURAL ANESTHESIA: Performed by: ANESTHESIOLOGY

## 2025-03-28 PROCEDURE — 2500000003 HC RX 250 WO HCPCS: Performed by: ADVANCED PRACTICE MIDWIFE

## 2025-03-28 PROCEDURE — 59400 OBSTETRICAL CARE: CPT | Performed by: STUDENT IN AN ORGANIZED HEALTH CARE EDUCATION/TRAINING PROGRAM

## 2025-03-28 RX ORDER — BUPIVACAINE HYDROCHLORIDE 2.5 MG/ML
INJECTION, SOLUTION EPIDURAL; INFILTRATION; INTRACAUDAL; PERINEURAL
Status: DISCONTINUED | OUTPATIENT
Start: 2025-03-28 | End: 2025-03-28 | Stop reason: SDUPTHER

## 2025-03-28 RX ORDER — ACETAMINOPHEN 500 MG
1000 TABLET ORAL EVERY 8 HOURS SCHEDULED
Status: DISCONTINUED | OUTPATIENT
Start: 2025-03-28 | End: 2025-03-30 | Stop reason: HOSPADM

## 2025-03-28 RX ORDER — IBUPROFEN 800 MG/1
800 TABLET, FILM COATED ORAL EVERY 8 HOURS
Status: DISCONTINUED | OUTPATIENT
Start: 2025-03-28 | End: 2025-03-30 | Stop reason: HOSPADM

## 2025-03-28 RX ORDER — IBUPROFEN 600 MG/1
600 TABLET, FILM COATED ORAL EVERY 6 HOURS PRN
Status: DISCONTINUED | OUTPATIENT
Start: 2025-03-28 | End: 2025-03-28

## 2025-03-28 RX ORDER — MAGNESIUM CARB/ALUMINUM HYDROX 105-160MG
TABLET,CHEWABLE ORAL
Status: DISCONTINUED
Start: 2025-03-28 | End: 2025-03-28

## 2025-03-28 RX ORDER — DOCUSATE SODIUM 100 MG/1
100 CAPSULE, LIQUID FILLED ORAL 2 TIMES DAILY
Status: DISCONTINUED | OUTPATIENT
Start: 2025-03-28 | End: 2025-03-30 | Stop reason: HOSPADM

## 2025-03-28 RX ORDER — SODIUM CHLORIDE 9 MG/ML
INJECTION, SOLUTION INTRAVENOUS PRN
Status: DISCONTINUED | OUTPATIENT
Start: 2025-03-28 | End: 2025-03-30 | Stop reason: HOSPADM

## 2025-03-28 RX ORDER — MAGNESIUM CARB/ALUMINUM HYDROX 105-160MG
45 TABLET,CHEWABLE ORAL ONCE
Status: DISCONTINUED | OUTPATIENT
Start: 2025-03-28 | End: 2025-03-28

## 2025-03-28 RX ADMIN — BUPIVACAINE HYDROCHLORIDE 6 ML: 2.5 INJECTION, SOLUTION EPIDURAL; INFILTRATION; INTRACAUDAL; PERINEURAL at 02:20

## 2025-03-28 RX ADMIN — ACETAMINOPHEN 650 MG: 325 TABLET ORAL at 00:01

## 2025-03-28 RX ADMIN — ACETAMINOPHEN 1000 MG: 500 TABLET ORAL at 05:20

## 2025-03-28 RX ADMIN — DOCUSATE SODIUM 100 MG: 100 CAPSULE, LIQUID FILLED ORAL at 20:24

## 2025-03-28 RX ADMIN — NIFEDIPINE 30 MG: 30 TABLET, FILM COATED, EXTENDED RELEASE ORAL at 08:58

## 2025-03-28 RX ADMIN — MISOPROSTOL 400 MCG: 200 TABLET ORAL at 03:21

## 2025-03-28 RX ADMIN — NIFEDIPINE 30 MG: 30 TABLET, FILM COATED, EXTENDED RELEASE ORAL at 20:50

## 2025-03-28 RX ADMIN — IBUPROFEN 800 MG: 800 TABLET, FILM COATED ORAL at 08:58

## 2025-03-28 RX ADMIN — Medication 10 ML/HR: at 00:36

## 2025-03-28 RX ADMIN — MAGNESIUM SULFATE HEPTAHYDRATE 2000 MG/HR: 40 INJECTION, SOLUTION INTRAVENOUS at 20:03

## 2025-03-28 RX ADMIN — MAGNESIUM SULFATE HEPTAHYDRATE 2000 MG/HR: 40 INJECTION, SOLUTION INTRAVENOUS at 09:16

## 2025-03-28 RX ADMIN — WATER 2000 MG: 1 INJECTION INTRAMUSCULAR; INTRAVENOUS; SUBCUTANEOUS at 05:21

## 2025-03-28 RX ADMIN — TRANEXAMIC ACID 1000 MG: 10 INJECTION, SOLUTION INTRAVENOUS at 03:25

## 2025-03-28 RX ADMIN — IBUPROFEN 800 MG: 800 TABLET, FILM COATED ORAL at 20:24

## 2025-03-28 RX ADMIN — NIFEDIPINE 30 MG: 30 TABLET, FILM COATED, EXTENDED RELEASE ORAL at 00:14

## 2025-03-28 ASSESSMENT — PAIN DESCRIPTION - DESCRIPTORS: DESCRIPTORS: CRAMPING

## 2025-03-28 ASSESSMENT — PAIN - FUNCTIONAL ASSESSMENT: PAIN_FUNCTIONAL_ASSESSMENT: ACTIVITIES ARE NOT PREVENTED

## 2025-03-28 ASSESSMENT — PAIN DESCRIPTION - LOCATION: LOCATION: HEAD

## 2025-03-28 ASSESSMENT — PAIN SCALES - GENERAL
PAINLEVEL_OUTOF10: 5
PAINLEVEL_OUTOF10: 3
PAINLEVEL_OUTOF10: 3

## 2025-03-28 NOTE — L&D DELIVERY NOTE
Blood Loss   Intrapartum & Postpartum: 25 1510 - 25 0539    Delivery Admission: 25 1403 - 25 0539         Intrapartum & Postpartum Delivery Admission    None                  End of Mother's Information  Mother: Esperanza Martinez \"Delmi\" #[id number]                Delivery Providers    Delivering clinician: Libertad Stoddard APRN - CNM     Provider Role    Kirill Powers DO Resident     Primary Nurse     Primary  Nurse     NICU Nurse     Neonatologist     Anesthesiologist     Nurse Anesthetist     Nurse Practitioner    Libertad Stoddard APRN - CNM Midwife     Nursery Nurse     Respiratory Therapist     Scrub Tech     Assistant Surgeon    Ani Jackson MD Resident              Erwin Assessment    Living Status: Living        Skin Color:   Heart Rate:   Reflex Irritability:   Muscle Tone:   Respiratory Effort:   Total:            1 Minute:    1    2    1    1    1    6         5 Minute:    1    2    2    1    1    7         10 Minute: 1    2    2    2    2    9                                   Apgars Assigned By: ANDERSON SILVEIRA RN              Resuscitation    Method: Bulb Suction, Stimulation, CPAP, Suctioning             Erwin Measurements      Birth Weight: 2230 g   Birth Length: 44.5 cm     Head Circumference: 31.5 cm     Chest Circumference: 29.5 cm     Abdominal Girth: 28 cm

## 2025-03-28 NOTE — ANESTHESIA POSTPROCEDURE EVALUATION
Department of Anesthesiology  Postprocedure Note    Patient: Esperanza Martinez  MRN: [id number]  YOB: 2005  Date of evaluation: 3/28/2025    Procedure Summary       Date: 03/27/25 Room / Location:     Anesthesia Start: 1504 Anesthesia Stop:     Procedure: Labor Analgesia Diagnosis:     Scheduled Providers:  Responsible Provider: Osito Dent DO    Anesthesia Type: epidural ASA Status: 2            Anesthesia Type: No value filed.    Radu Phase I:      Radu Phase II:      Anesthesia Post Evaluation    Patient location during evaluation: bedside  Patient participation: complete - patient participated  Level of consciousness: awake  Pain score: 0  Airway patency: patent  Nausea & Vomiting: no nausea  Cardiovascular status: hemodynamically stable  Respiratory status: acceptable  Hydration status: euvolemic  Pain management: adequate    No notable events documented.

## 2025-03-28 NOTE — LACTATION NOTE
This note was copied from a baby's chart.  This is mother's first baby. She is currently on magnesium sulfate. Baby has been sleepy and mother has been using hand pump and hand expressing. She wishes to breast feed and pump. Symphony pump set up at bedside and instructions for use given. Mother has a breast pump for home use. Reviewed how to store and prepare expressed breast milk for baby.     Discussed with mother her plan for feeding.  Reviewed the benefits of exclusive breast milk feeding during the hospital stay.   Informed her of the risks of using formula to supplement in the first few days of life as well as the benefits of successful breast milk feeding; referred her to the Breastfeeding booklet about this information.   She acknowledges understanding of information reviewed and states that it is her plan to breastfeed/pump her infant.  Will support her choice and offer additional information as needed.     Pumping:  Guidelines for pumping, milk collection and storage, proper cleaning of pump parts all reviewed.  How to establish and maintain breast milk supply through pumping reviewed.  Differences between hospital grade rental pumps vs store bought double electric/hand pumps discussed.  Set up pumping with double electric set up.  Assisted with pump session.   List of area pump rental locations and lactation support services provided.    Mother will successfully establish breastfeeding by feeding in response to early feeding cues   or wake every 3h, will obtain deep latch, and will keep log of feedings/output.  Taught to BF at hunger cues and or q 2-3 hrs and to offer 10-20 drops of hand expressed colostrum at any non-feeds.      Left Breast: Soft  Left Nipple: Protrude  Right Nipple: Protrude  Right Breast: Soft                     Breast Care: Lanolin provided, Pumping supply provided, Using breast pump     Lactation Comment: Mother plans to breastfeed and pump.    Breastfeeding handouts given. All

## 2025-03-29 PROCEDURE — 99024 POSTOP FOLLOW-UP VISIT: CPT | Performed by: FAMILY MEDICINE

## 2025-03-29 PROCEDURE — 6370000000 HC RX 637 (ALT 250 FOR IP)

## 2025-03-29 PROCEDURE — 1120000000 HC RM PRIVATE OB

## 2025-03-29 PROCEDURE — 94761 N-INVAS EAR/PLS OXIMETRY MLT: CPT

## 2025-03-29 RX ADMIN — DOCUSATE SODIUM 100 MG: 100 CAPSULE, LIQUID FILLED ORAL at 08:26

## 2025-03-29 RX ADMIN — IBUPROFEN 800 MG: 800 TABLET, FILM COATED ORAL at 23:17

## 2025-03-29 RX ADMIN — IBUPROFEN 800 MG: 800 TABLET, FILM COATED ORAL at 08:25

## 2025-03-29 RX ADMIN — NIFEDIPINE 30 MG: 30 TABLET, FILM COATED, EXTENDED RELEASE ORAL at 23:13

## 2025-03-29 RX ADMIN — NIFEDIPINE 30 MG: 30 TABLET, FILM COATED, EXTENDED RELEASE ORAL at 08:26

## 2025-03-29 ASSESSMENT — PAIN DESCRIPTION - DESCRIPTORS: DESCRIPTORS: DISCOMFORT;SORE

## 2025-03-29 ASSESSMENT — PAIN - FUNCTIONAL ASSESSMENT: PAIN_FUNCTIONAL_ASSESSMENT: ACTIVITIES ARE NOT PREVENTED

## 2025-03-29 ASSESSMENT — PAIN SCALES - GENERAL: PAINLEVEL_OUTOF10: 3

## 2025-03-29 ASSESSMENT — PAIN DESCRIPTION - ORIENTATION: ORIENTATION: LOWER

## 2025-03-29 ASSESSMENT — PAIN DESCRIPTION - LOCATION: LOCATION: VAGINA

## 2025-03-29 NOTE — LACTATION NOTE
This note was copied from a baby's chart.  Mom states\" breast feeding is going well.\"  Instructed Mom to isra LC when she gets ready to feed her baby.

## 2025-03-30 VITALS
HEART RATE: 99 BPM | OXYGEN SATURATION: 99 % | DIASTOLIC BLOOD PRESSURE: 86 MMHG | SYSTOLIC BLOOD PRESSURE: 126 MMHG | RESPIRATION RATE: 16 BRPM | TEMPERATURE: 98.2 F

## 2025-03-30 LAB
BACTERIA SPEC CULT: NORMAL
SERVICE CMNT-IMP: NORMAL

## 2025-03-30 PROCEDURE — 6370000000 HC RX 637 (ALT 250 FOR IP)

## 2025-03-30 PROCEDURE — 94761 N-INVAS EAR/PLS OXIMETRY MLT: CPT

## 2025-03-30 PROCEDURE — 6370000000 HC RX 637 (ALT 250 FOR IP): Performed by: ADVANCED PRACTICE MIDWIFE

## 2025-03-30 RX ORDER — IBUPROFEN 800 MG/1
800 TABLET, FILM COATED ORAL EVERY 8 HOURS PRN
Qty: 30 TABLET | Refills: 0 | Status: SHIPPED | OUTPATIENT
Start: 2025-03-30

## 2025-03-30 RX ORDER — PSEUDOEPHEDRINE HCL 30 MG
100 TABLET ORAL DAILY
Qty: 20 CAPSULE | Refills: 0 | Status: SHIPPED | OUTPATIENT
Start: 2025-03-30

## 2025-03-30 RX ORDER — NIFEDIPINE 30 MG/1
30 TABLET, EXTENDED RELEASE ORAL 2 TIMES DAILY
Qty: 60 TABLET | Refills: 0 | Status: SHIPPED | OUTPATIENT
Start: 2025-03-30

## 2025-03-30 RX ADMIN — DOCUSATE SODIUM 100 MG: 100 CAPSULE, LIQUID FILLED ORAL at 08:10

## 2025-03-30 RX ADMIN — NIFEDIPINE 30 MG: 30 TABLET, FILM COATED, EXTENDED RELEASE ORAL at 08:10

## 2025-03-30 RX ADMIN — ACETAMINOPHEN 1000 MG: 500 TABLET ORAL at 02:14

## 2025-03-30 RX ADMIN — IBUPROFEN 800 MG: 800 TABLET, FILM COATED ORAL at 08:10

## 2025-03-30 ASSESSMENT — PAIN DESCRIPTION - LOCATION
LOCATION: ABDOMEN
LOCATION: ABDOMEN

## 2025-03-30 ASSESSMENT — PAIN DESCRIPTION - ORIENTATION
ORIENTATION: LOWER
ORIENTATION: LOWER

## 2025-03-30 ASSESSMENT — PAIN DESCRIPTION - DESCRIPTORS
DESCRIPTORS: DISCOMFORT
DESCRIPTORS: CRAMPING

## 2025-03-30 ASSESSMENT — PAIN SCALES - GENERAL
PAINLEVEL_OUTOF10: 3
PAINLEVEL_OUTOF10: 3

## 2025-03-30 ASSESSMENT — PAIN - FUNCTIONAL ASSESSMENT: PAIN_FUNCTIONAL_ASSESSMENT: ACTIVITIES ARE NOT PREVENTED

## 2025-03-30 NOTE — DISCHARGE SUMMARY
[facility] DEP       To Provider:  Dr Hunter - [facility]    Signed By:  Yael Brooks MD    Family Medicine Resident

## 2025-03-30 NOTE — PROGRESS NOTES
[id number] [id number]   Office [phone], Fax [phone]    Antepartum Magnesium Note    Subjective: Pt comfortable.  Reports no HA/scotoma/RUQ pain.  No CP/SOB/N/V. Bates bulb still in place. Family in room for support.     Headache resolved.    Objective: Patient Vitals for the past 4 hrs:   Pulse Resp BP SpO2   25 2247 83 16 129/65 96 %   25 2147 85 17 (!) 145/87 99 %   25 2047 84 15 123/77 95 %           Intake/Output Summary (Last 24 hours) at 3/26/2025 2349  Last data filed at 3/26/2025 2247  Gross per 24 hour   Intake 2210.56 ml   Output 450 ml   Net 1760.56 ml       Physical exam:  Gen: AAO x 3  CV: RRR no m/r/g  Resp: CTAB  Abdomen: gravid but soft, no tenderness in RUQ or fundus  Ext: 2+ DTRs present bilaterally, trace bilateral LE edema  SVE: deferred at this time  FHTs: Reactive, baseline 140  Membranes intact, 1 small contraction on monitor.     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     A/P 19 y.o. P0 at 37w1d by LMP cw 9w US who is admitted for evaluation of severe range pressures and HA that started today, admitted for IOL iso pre-E with severe features.     IOL - SIUP at 37w1d  SVE at 1539 on 3/26 1/20/3. Bates placed approximately 1600, still in place. Anticipate . No contractions currently.  - Plan for next cervical check in 12 hours or when bates bulb out  - Continue miso 50 q4h   - Pain control: Patient does not currently desire epidural; spoke with  to clarify wishes. Advised patient of timing of epidural should she desire.  - Fetal tracing reviewed, reactive, baseline 140     Pre-E with severe features: Severe range pressure noted at prenatal visit today, 
  [address] [id number]   Office [phone], Fax [phone]    Postpartum Magnesium Note    Subjective: Pt comfortable.  Reports no HA/scotoma/RUQ pain.  No CP/SOB/N/V.     Objective: No data found.          Intake/Output Summary (Last 24 hours) at 3/29/2025 0159  Last data filed at 3/28/2025 1855  Gross per 24 hour   Intake 5306.25 ml   Output 4635 ml   Net 671.25 ml       Gen: AAO x 3  CV: RRR no m/r/g  Resp: CTAB  Abdomen: soft, no tenderness in RUQ or fundus  Ext: 2+ DTRs, mild + LE edema    A/P 19 y.o.  s/p  at 37w3d. Pregnancy was complicated by preE with severe features, IUGR, Hep B NI, GBS unknown. On Mg for 24h postpartum. Patient appears to be having uncomplicated post-partum course.       Pre-E with severe features: Severe range pressure noted at prenatal visit prior to arrival, with continued severe range pressures on admission. UPCR 0.3, AST 52/ALT 41, Cr 0.54, Plt 182. Labs stable. Nromal to mild range pressures currently. On Mag for seizure prophylaxis until ~0300 3/29.  - Continue Mag checks q4h  - Continue nifedipine 30 mg BID  - Mg to end at ~0300 overnight    Patient will be discussed with Dr. Dawson (OB Hospitalist).   Kirill Powers DO   
  [address] [id number]   Office [phone], Fax [phone]    Antepartum Magnesium Note    Subjective: Pt comfortable. Reports no HA/scotoma/RUQ pain.  No CP/SOB/N/V. Bates bulb still in place.     Continuing to feel contractions, more frequently now. SCD's in place.      Objective: Patient Vitals for the past 4 hrs:   Pulse Resp BP SpO2   25 0348 75 18 (!) 145/91 98 %   25 0248 78 16 (!) 145/99 99 %   25 0147 81 16 (!) 142/98 99 %           Intake/Output Summary (Last 24 hours) at 3/27/2025 0533  Last data filed at 3/27/2025 0348  Gross per 24 hour   Intake 3207.84 ml   Output 1700 ml   Net 1507.84 ml       Physical exam:  Gen: AAO x 3  CV: RRR no m/r/g  Resp: CTAB  Abdomen: gravid but soft, no tenderness in RUQ or fundus  Ext: 2+ DTRs present bilaterally, trace bilateral LE edema, SCD's in place  SVE: deferred at this time  FHTs: Reactive, baseline 130  Membranes intact  Accelerations: yes  Decelerations: none  Uterine contractions: Present, q 2-3min    PNL: Hgb 14.1-->13.0; Rh+ and ab screen neg; HIV, syphilis NR, GC/Chlaymydia neg; Hep C neg; Hep B Ag neg, and Hep B immunity unknown; rubella and VZV immune;  early GTT NI; GTT @ 24-28wks wnl; TSH NI; Ucx NG; NIPT LR and Carrier Screening 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     A/P 19 y.o. P0 at 37w1d by LMP cw 9w US who is admitted for evaluation of severe range pressures and new onset HA prior to admission, admitted for IOL iso pre-E with severe features.     IOL - SIUP at 37w1d  SVE at 1539 on 3/26 1/20/-3. Bates placed approximately 1600, still in place. Anticipate . Starting to feel contractions q2-3 minutes.  - Plan for next cervical check in 12 hours or when bates bulb out  - Continue miso 50 q4h   - Pain control: Patient does not currently desire epidural; spoke with  to clarify wishes. Advised patient of timing of epidural should she desire.  - Fetal 
  [address] [place] [address]   Office [phone], Fax [phone]    Antepartum Magnesium Note    Subjective: Pt comfortable.  Reports no HA/scotoma/RUQ pain.  No CP/SOB/N/V.    Objective: Patient Vitals for the past 4 hrs:   Pulse Resp BP SpO2   25 1537 (!) 113 16 (!) 141/95 99 %   25 1531 92 -- (!) 136/91 --   25 1527 90 -- (!) 132/92 96 %   25 1524 90 -- (!) 130/95 --   25 1517 86 -- (!) 139/94 --   25 1445 90 16 (!) 148/95 98 %   25 1345 88 18 (!) 141/91 100 %           Intake/Output Summary (Last 24 hours) at 3/27/2025 1726  Last data filed at 3/27/2025 1639  Gross per 24 hour   Intake 4864.18 ml   Output 3800 ml   Net 1064.18 ml       Gen: AAO x 3  CV: RRR no m/r/g  Resp: CTAB  Abdomen: gravid but soft, no tenderness in RUQ or fundus  Ext: 2+ DTRs, trace + LE edema  SVE: deferred  FHTs: 125 baseline, moderate variability, +accels, - decels  [place]: regular contractions, every 2-4 min    A/P: 19 y.o.  at 37w2d by LMP cw 9w US who is admitted for evaluation of severe range pressures and new onset HA prior to admission, admitted for IOL iso pre-E with severe features.      IOL - SIUP at 37w2d  SVE 1350 5/70/-1, unchanged at this time. AROM at 1600 with scant clear fluid. IUPC successfully placed. Contraction tracing improved with IUPC. Anticipate .   - continue pit per protocol  - epidural placed   - next SVE ~2000 or sooner prn  - Fetal tracing reviewed, Cat 1     Pre-E with severe features: Severe range pressure noted at prenatal visit today, with continued severe range pressures on admission. UPCR 0.3, AST 52/ALT 41, Cr 0.54, Plt 182. Today, labs stable. IOL as above. Mild range pressures currently. On Mag for seizure prophylaxis.    - Continue Mag checks q2h  - Continue nifedipine 30 mg BID     FGR:  - Saw MFM on 3/20/2025: US at 35w2d EFW 2202g, 10%ile, AC 3%ile, nml UAD and BPP.     Pt discussed and care supervised by Dr. Vincent (OB 
  [address] [id number]   Office [phone], Fax [phone]  Post-Partum Day Number 1 Progress Note    Patient doing well post-partum without significant complaint.  Pain well controlled.  Lochia minimal.  Tolerating normal diet.  Ambulating.  Voiding without difficulty.  + flatus.  Has not yet had BM since delivery. Denies any headache, RUQ pain, vision changes, chest pain, or shortness of breath. No other concerns at this time.       Vitals:  Patient Vitals for the past 8 hrs:   BP Temp Temp src Pulse Resp SpO2   25 0742 122/72 98.7 °F (37.1 °C) Oral (!) 105 18 99 %   25 0455 119/73 -- -- 89 20 --   25 0355 128/69 -- -- 90 22 --   25 0255 124/74 -- -- 94 20 --   25 0155 (!) 109/59 -- -- 78 20 --   25 0055 (!) 113/56 -- -- 79 20 --     Temp (24hrs), Av.7 °F (37.1 °C), Min:98.1 °F (36.7 °C), Max:99 °F (37.2 °C)      Exam:  Patient without distress.               CTAB, no w/r/r/c.               RRR, +S1 and S2, no m/r/g.    Abdomen soft, fundus firm at level of umbilicus, nontender.               Perineum with normal lochia noted.               Lower extremities:  No edema. No palpable cords or tenderness.    Lab/Data Review:  No results found for this or any previous visit (from the past 12 hours).      Assessment and Plan:    Esperanza Martinez is a 19 y.o.  s/p  at 37 weeks 3 days. Pregnancy was complicated by preE with severe features, IUGR, Hep B NI, GBS unknown.     Pre-E with severe features: Severe range pressure noted at prenatal visit prior to arrival, with continued severe range pressures on admission. UPCR 0.3, AST 52/ALT 41, Cr 0.54, Plt 182. Labs stable. Normal to mild range pressures currently. Mg was continued 24 hours postpartum dc'd @ 0300 on 2025.   - Continue to monitor BP  - Continue nifedipine 30 mg BID    Postpartum fever: Pt w/ postpartum fever with max temp of 102.2F, likely due to cytotec administered postpartum. 
  [address] [id number]   Office [phone], Fax [phone]    Antepartum Magnesium Note    Subjective: Pt comfortable.  Reports no HA/scotoma/RUQ pain.  No CP/SOB/N/V. [place] out at 0600.     Patient declining pitocin at this point, saying she wants to dilate naturally (use peanut ball, etc.). She has a birth plan with her  that she would like to try and adhere to .     Objective: Patient Vitals for the past 4 hrs:   Temp Pulse Resp BP SpO2   25 0547 -- 83 16 (!) 132/90 98 %   25 0447 -- 80 14 (!) 137/91 98 %   25 0348 98 °F (36.7 °C) 75 18 (!) 145/91 98 %   25 0248 -- 78 16 (!) 145/99 99 %           Intake/Output Summary (Last 24 hours) at 3/27/2025 0626  Last data filed at 3/27/2025 0547  Gross per 24 hour   Intake 3679.91 ml   Output 1700 ml   Net 1979.91 ml       Gen: AAO x 3  CV: RRR no m/r/g  Resp: CTAB  Abdomen: gravid but soft, no tenderness in RUQ or fundus  Ext: 2+ DTRs, trace + LE edema  SVE: deferred  FHTs: Baseline 130 bpm, minimal/moderate variability, +accels, no decels  [place]: irregular contractions every 4-8 min    A/P 19 y.o.  at 37w1d by LMP cw 9w US who is admitted for evaluation of severe range pressures and new onset HA prior to admission, admitted for IOL iso pre-E with severe features.      IOL - SIUP at 37w1d  SVE /-3 on admission. [place] out 0600 today. Anticipate .   - pt declining pitocin currently; this MD and several RN have discussed risks of prolonging IOL iso PreE and on Mg but pt continuing to decline pit right now  - will start pit as able  - will recheck SVE soon  - Pain control: Patient does not currently desire epidural; spoke with  to clarify wishes. Advised patient of timing of epidural should she desire.  - Fetal tracing reviewed, Cat 1     Pre-E with severe features: Severe range pressure noted at prenatal visit today, with continued severe range pressures on admission. UPCR 0.3, AST 52/ALT 41, Cr 
  [id number] [id number]   Office [phone], Fax [phone]    Antepartum Magnesium Note    Subjective: Pt comfortable, contraction pain 4/10.  Reports no HA/scotoma/RUQ pain.  No CP/SOB/N/V. Urinating well.     Objective: Patient Vitals for the past 4 hrs:   Temp Pulse Resp BP SpO2   2517 -- 79 -- (!) 144/95 --   25 0902 -- -- -- -- 98 %   25 0857 -- -- -- -- 100 %   2554 -- 85 -- (!) 147/111 --   25 0852 -- -- -- -- 99 %   25 0847 -- -- -- -- 100 %   2545 -- 87 16 (!) 154/102 --   25 0843 -- -- -- -- 90 %   25 0842 -- -- -- -- 99 %   25 0838 -- -- -- -- 90 %   25 0837 -- -- -- -- 99 %   25 0832 -- -- -- -- 100 %   25 0827 -- -- -- -- 100 %   2522 -- -- -- -- 98 %   25 0820 -- -- -- -- 93 %   25 0817 -- -- -- -- 91 %   25 0814 -- -- -- -- 94 %   25 0812 -- -- -- -- 98 %   25 0807 -- -- -- -- 100 %   25 0806 -- -- -- -- 91 %   25 0802 -- -- -- -- 99 %   257 -- -- -- -- 98 %   25 0752 -- -- -- -- 95 %   25 0750 -- -- -- -- 95 %   25 0745 98.1 °F (36.7 °C) 86 16 (!) 141/96 95 %   25 0648 -- 78 15 138/88 100 %           Intake/Output Summary (Last 24 hours) at 3/27/2025 0947  Last data filed at 3/27/2025 0845  Gross per 24 hour   Intake 3905.28 ml   Output 2600 ml   Net 1305.28 ml       Gen: AAO x 3  CV: RRR no m/r/g  Resp: CTAB  Abdomen: gravid but soft, no tenderness in RUQ or fundus  Ext: 2+ DTRs, trace LE edema  SVE: /-2 at 0940  FHTs: baseline 130 bpm, moderate variability, +accels, no decels  [place]: irregular contractions, every 3-6 minutes    A/P: 19 y.o.  at 37w2d by LMP cw 9w US who is admitted for evaluation of severe range pressures and new onset HA prior to admission, admitted for IOL iso pre-E with severe features.      IOL - SIUP at 37w2d  SVE 0940 /-3. Very irregular, spaced out and ineffective 
  [address] [id number]   Office [phone], Fax [phone]    Postpartum Magnesium Note    Subjective: Pt comfortable.  Reports no HA/scotoma/RUQ pain.  No CP/SOB/N/V.    Objective: Patient Vitals for the past 4 hrs:   Pulse Resp BP SpO2   25 1255 96 16 139/65 100 %   25 1147 92 18 128/75 99 %   25 1102 (!) 104 18 138/75 99 %           Intake/Output Summary (Last 24 hours) at 3/28/2025 1457  Last data filed at 3/28/2025 1255  Gross per 24 hour   Intake 5633.99 ml   Output 5085 ml   Net 548.99 ml       Gen: AAO x 3  CV: RRR no m/r/g  Resp: CTAB  Abdomen: gravid but soft, no tenderness in RUQ or fundus  Ext: 2+ DTRs, trace LE edema    Assessment and Plan:    Esperanza Martinez is a 19 y.o.  s/p  at 37w3d. Pregnancy was complicated by preE with severe features, IUGR, Hep B NI, GBS unknown. On Mg for 24h postpartum. Patient appears to be having uncomplicated post-partum course.       Pre-E with severe features: Severe range pressure noted at prenatal visit prior to arrival, with continued severe range pressures on admission. UPCR 0.3, AST 52/ALT 41, Cr 0.54, Plt 182. Labs stable. Nromal to mild range pressures currently. On Mag for seizure prophylaxis until ~0300 3/29.  - Continue Mag checks q4h  - Continue nifedipine 30 mg BID  - Mg to end at ~0300 overnight    Pt discussed with Dr. Dawson (OB Hospitalist).   Yael Brooks MD   
  [address] [id number]   Office [phone], Fax [phone]    Postpartum Magnesium Note    Subjective: Pt comfortable.  Reports no HA/scotoma/RUQ pain.  No CP/SOB/N/V.    Objective: Patient Vitals for the past 4 hrs:   Pulse Resp BP SpO2   25 1255 96 16 139/65 100 %   25 1147 92 18 128/75 99 %   25 1102 (!) 104 18 138/75 99 %   25 1055 (!) 104 18 -- 99 %           Intake/Output Summary (Last 24 hours) at 3/28/2025 1359  Last data filed at 3/28/2025 1255  Gross per 24 hour   Intake 5683.96 ml   Output 5185 ml   Net 498.96 ml       Gen: AAO x 3  CV: RRR no m/r/g  Resp: CTAB  Abdomen: gravid but soft, no tenderness in RUQ or fundus  Ext: 2+ DTRs, trace + LE edema      Assessment and Plan:    Esperanza Martinez is a 19 y.o.  s/p  at 37w3d. Pregnancy was complicated by preE with severe features, IUGR, Hep B NI, GBS unknown. On Mg for 24h postpartum. Patient appears to be having uncomplicated post-partum course.       Pre-E with severe features: Severe range pressure noted at prenatal visit prior to arrival, with continued severe range pressures on admission. UPCR 0.3, AST 52/ALT 41, Cr 0.54, Plt 182. Labs stable. Mild range pressures currently. On Mag for seizure prophylaxis until ~0300 3/29.  - Continue Mag checks q4h  - Continue nifedipine 30 mg BID     Patient discussed with Dr. Damico.   Yael Brooks MD    
  [address] [id number]   Office [phone], Fax [phone]    Postpartum Magnesium Note    Subjective: Pt comfortable.  Reports no HA/scotoma/RUQ pain.  No CP/SOB/N/V.    Objective: Patient Vitals for the past 4 hrs:   Temp Pulse Resp BP SpO2   25 0755 98.1 °F (36.7 °C) (!) 127 18 133/60 98 %   25 0655 -- (!) 129 -- 130/74 --   25 0644 (!) 100.7 °F (38.2 °C) -- 15 -- --           Intake/Output Summary (Last 24 hours) at 3/28/2025 1032  Last data filed at 3/28/2025 0755  Gross per 24 hour   Intake 4282.53 ml   Output 4085 ml   Net 197.53 ml       Gen: AAO x 3  CV: RRR no m/r/g  Resp: CTAB  Abdomen: gravid but soft, no tenderness in RUQ or fundus  Ext: 2+ DTRs, trace + LE edema      Assessment and Plan:    Esperanza Martinez is a 19 y.o.  s/p  at 37w3d. Pregnancy was complicated by preE with severe features, IUGR, Hep B NI, GBS unknown. On Mg for 24h postpartum. Patient appears to be having uncomplicated post-partum course.       Pre-E with severe features: Severe range pressure noted at prenatal visit prior to arrival, with continued severe range pressures on admission. UPCR 0.3, AST 52/ALT 41, Cr 0.54, Plt 182. Labs stable. Mild range pressures currently. On Mag for seizure prophylaxis until ~0300 3/29.  - Continue Mag checks q4h  - Continue nifedipine 30 mg BID     Patient discussed with Dr. Damico.  Yael Brooks MD   
  [address] [id number]   Office [phone], Fax [phone]    Antepartum Magnesium Note    Subjective: Pt comfortable.  Reports no HA/scotoma/RUQ pain.  No CP/SOB/N/V. Bates bulb still in place. Family in room for support. Starting to feel contractions q10-15 min. Per RN patient with transient episode of shaking after getting out of bed to bedside commode, which has been intermittently coming and going.      Objective: Patient Vitals for the past 4 hrs:   Temp Pulse Resp BP SpO2   25 0147 -- 81 16 (!) 142/98 99 %   25 0047 97.9 °F (36.6 °C) 83 18 137/89 97 %   25 2348 -- 81 16 (!) 156/98 99 %   25 2247 -- 83 16 129/65 96 %           Intake/Output Summary (Last 24 hours) at 3/27/2025 0214  Last data filed at 3/27/2025 0147  Gross per 24 hour   Intake 2984.09 ml   Output 900 ml   Net 2084.09 ml       Physical exam:  Gen: AAO x 3  CV: RRR no m/r/g  Resp: CTAB  Abdomen: gravid but soft, no tenderness in RUQ or fundus  Ext: 2+ DTRs present bilaterally, trace bilateral LE edema  SVE: deferred at this time  FHTs: Reactive, baseline 140  Membranes intact  Uterine contractions: Present     PNL: Hgb 14.1-->13.0; Rh+ and ab screen neg; HIV, syphilis NR, GC/Chlaymydia neg; Hep C neg; Hep B Ag neg, and Hep B immunity unknown; rubella and VZV immune;   early GTT NI; GTT @ 24-28wks wnl; TSH NI; Ucx NG; NIPT LR and Carrier Screening 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     A/P 19 y.o. P0 at 37w1d by LMP cw 9w US who is admitted for evaluation of severe range pressures and new onset HA prior to admission, admitted for IOL iso pre-E with severe features.     IOL - SIUP at 37w1d  SVE at 1539 on 3/26 1/20/-3. Bates placed approximately 1600, still in place. Anticipate . Starting to feel contractions q10min.  - Plan for next cervical check in 12 hours or when bates bulb out  - Continue miso 50 q4h   - Pain control: Patient does not 
  [address] [id number]   Office [phone], Fax [phone]    Postpartum Magnesium Note    Subjective: Pt comfortable.  Reports no HA/scotoma/RUQ pain.  No CP/SOB/N/V.  Doing well, eating well postpartum    Objective: Patient Vitals for the past 4 hrs:   Temp Pulse Resp BP SpO2   25 1855 98.1 °F (36.7 °C) (!) 109 18 (!) 120/97 100 %           Intake/Output Summary (Last 24 hours) at 3/28/2025 2234  Last data filed at 3/28/2025 1855  Gross per 24 hour   Intake 6130.31 ml   Output 5060 ml   Net 1070.31 ml       Gen: AAO x 3  CV: RRR no m/r/g  Resp: CTAB  Abdomen: soft, no tenderness in RUQ or fundus  Ext: 2+ DTRs, mild + LE edema    A/P 19 y.o.  s/p  at 37w3d. Pregnancy was complicated by preE with severe features, IUGR, Hep B NI, GBS unknown. On Mg for 24h postpartum. Patient appears to be having uncomplicated post-partum course.       Pre-E with severe features: Severe range pressure noted at prenatal visit prior to arrival, with continued severe range pressures on admission. UPCR 0.3, AST 52/ALT 41, Cr 0.54, Plt 182. Labs stable. Nromal to mild range pressures currently. On Mag for seizure prophylaxis until ~0300 3/29.  - Continue Mag checks q4h  - Continue nifedipine 30 mg BID  - Mg to end at ~0300 overnight    Patient will be discussed with Dr. Dawson (OB Hospitalist).   Kirill Powers DO   
  [address] [place] [address]   Office [phone], Fax [phone]    Postpartum Magnesium Note    Subjective: Pt comfortable.  Reports no HA/scotoma/RUQ pain.  No CP/SOB/N/V.    Doing well following delivery, is breastfeeding baby in the room.     Objective: Patient Vitals for the past 4 hrs:   Pulse BP   25 0444 (!) 112 (!) 145/83   25 0430 -- (!) 142/80   25 0415 -- (!) 148/80   25 0409 -- (!) 140/84   25 0345 (!) 141 --   25 0330 -- 139/87   25 0314 -- (!) 135/102   25 0300 -- (!) 134/94   25 0245 (!) 112 (!) 146/101   25 0230 -- (!) 140/92   25 0216 -- 126/78   25 0215 -- 126/78   25 0146 (!) 112 130/82   25 0144 (!) 112 130/82   25 0129 -- 121/77           Intake/Output Summary (Last 24 hours) at 3/28/2025 0525  Last data filed at 3/28/2025 0145  Gross per 24 hour   Intake 1908.28 ml   Output 3975 ml   Net -2066.72 ml       Gen: AAO x 3  CV: Tachycardic no m/r/g  Resp: CTAB  Abdomen: fundus firm, no tenderness in RUQ or fundus  Ext: 2+ DTRs,  + LE edema    A/P 19 y.o.  at 37w2d by LMP cw 9w US who is admitted for evaluation of severe range pressures and new onset HA prior to admission, admitted for IOL iso pre-E with severe features. Delivered at 0310 on 3/28.    Pre-E with severe features: Severe range pressure noted at prenatal visit prior to arrival, with continued severe range pressures on admission. UPCR 0.3, AST 52/ALT 41, Cr 0.54, Plt 182. Today, labs stable. IOL as above. Mild range pressures currently. On Mag for seizure prophylaxis.    - Continue Mag checks q4h  - Continue nifedipine 30 mg BID  - Continue routine postpartum care   - Plan to continue mag until 24h post delivery    Patient will be discussed with Libertad Powers DO    
  [address] [id number]   Office [phone], Fax [phone]    Antepartum Magnesium Note    Subjective: Pt comfortable. Reports no HA/scotoma/RUQ pain.  No CP/SOB/N/V. Bates bulb still in place.     Continuing to feel contractions, more frequently now every 5 minutes.       Objective: Patient Vitals for the past 4 hrs:   Temp Pulse Resp BP SpO2   25 0248 -- 78 16 (!) 145/99 99 %   25 0147 -- 81 16 (!) 142/98 99 %   25 0047 97.9 °F (36.6 °C) 83 18 137/89 97 %   25 2348 -- 81 16 (!) 156/98 99 %           Intake/Output Summary (Last 24 hours) at 3/27/2025 0344  Last data filed at 3/27/2025 0248  Gross per 24 hour   Intake 3159.81 ml   Output 1700 ml   Net 1459.81 ml       Physical exam:  Gen: AAO x 3  CV: RRR no m/r/g  Resp: CTAB  Abdomen: gravid but soft, no tenderness in RUQ or fundus  Ext: 2+ DTRs present bilaterally, trace bilateral LE edema  SVE: deferred at this time  FHTs: Reactive, baseline 130  Membranes intact  Uterine contractions: Present, q 2-3min    PNL: Hgb 14.1-->13.0; Rh+ and ab screen neg; HIV, syphilis NR, GC/Chlaymydia neg; Hep C neg; Hep B Ag neg, and Hep B immunity unknown; rubella and VZV immune;  early GTT NI; GTT @ 24-28wks wnl; TSH NI; Ucx NG; NIPT LR and Carrier Screening 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     A/P 19 y.o. P0 at 37w1d by LMP cw 9w US who is admitted for evaluation of severe range pressures and new onset HA prior to admission, admitted for IOL iso pre-E with severe features.     IOL - SIUP at 37w1d  SVE at 1539 on 3/26 1/20/-3. Bates placed approximately 1600, still in place. Anticipate . Starting to feel contractions q5-10min.  - Plan for next cervical check in 12 hours or when bates bulb out  - Continue miso 50 q4h   - Pain control: Patient does not currently desire epidural; spoke with  to clarify wishes. Advised patient of timing of epidural should she desire.  - 
0006: PT stating she is having increased pain with contractions. RN educating pt on use of epidural bolus button.     0208: RN at the bedside. Pt and support persons stating that her sharp/throbbing pain is not subsiding after pushing button many times. RN notifying Jose CRNA for re-dose.   
0700 This RN received report and assumed care of pt. Pt is resting in her bed but is easily woken.     0745 Pt endorses fetal movement. Pt denies other complaints at this time. POC discussed with pt; pt declines pitocin at this time and will discuss with MD.     [id number] Dr. Lancaster and Dr. Goodson to the bedside. Pt consents to SVE; 5 cm, 70%, -2. POC discussed with pt to include the utilization of pitocin and its risks and benefits. Pt consents to pitocin for IOL at this time. All pt questions and concerns were addressed by MD.     1115 Leslie at the bedside. This RN discussed POC with  at this time to include the importance of maintaining appropriate activity for her condition.     [id number] Dr. Lancaster and Dr. Goodson to the bedside. Pt consents to SVE; 5 cm, 70%, -1. Discussed with pt about placing an IUPC to better monitor and assess contractions to be able to titrate pitocin appropriately. Pt verbalized wanting an epidural prior to placement. IV fluid bolus initiated at this time.     1505 Pt sitting up to side of bed for epidural.     1509 Time out     1515 Test dose    1602 Dr. Lancaster and Dr. Goodson back to bedside. Pt consents to placement of IUPC.     1900 Bedside and Verbal shift change report given to ANDERSON Casas RN (oncoming nurse) by SONIA Swift RN (offgoing nurse). Report included the following information Nurse Handoff Report, ED SBAR, Adult Overview, Intake/Output, MAR, and Recent Results.    
0705: Bedside and Verbal shift change report given to YOON Onofre (oncoming nurse) by YOON Jameson (offgoing nurse). Report included the following information Nurse Handoff Report, MAR, and Recent Results.     0930:TRANSFER - OUT REPORT:    Verbal report given to YOON Zaragoza on Esperanza Martinez  being transferred to MIU for routine progression of patient care       Report consisted of patient's Situation, Background, Assessment and   Recommendations(SBAR).     Information from the following report(s) Nurse Handoff Report, MAR, and Recent Results was reviewed with the receiving nurse.           Lines:   Peripheral IV 03/27/25 Left;Proximal;Anterior Forearm (Active)   Site Assessment Clean, dry & intact 03/29/25 0742   Line Status Normal saline locked 03/29/25 0742   Line Care Connections checked and tightened 03/29/25 0742   Phlebitis Assessment No symptoms 03/29/25 0742   Infiltration Assessment 0 03/29/25 0742   Alcohol Cap Used Yes 03/29/25 0742   Dressing Status Clean, dry & intact 03/29/25 0742   Dressing Type Transparent 03/29/25 0742        Opportunity for questions and clarification was provided.      Patient transported with:  Registered Nurse    Baby bands checked and fundal completed at handoff.         
0720 SBAR rec'd by ANDERSON Casas RN. Patient resting in room with mother at bedside.  No complaints at this time.    1915 SBAR given to Shanna NETTLES.   
1415 Pt reports she was sent from the office due to elevated pressures and suspected IUGR. Pt placed on TOCO/EFM monitors. Pt endorses fetal movement and denies HA, nausea, epigastric pain, visual disturbances or other complaints at this time    1420 Dr. Melchor aware of pt arrival and complaints.     1600 Pt consents to SVE and placement of cook catheter. Pt tolerated well.     1625 Dr. Melchor aware of BP recheck that remains severe range. New orders for labetalol given.   
1900: Bedside and Verbal shift change report given to Geno RN  (oncoming nurse) by Jeniffer RN  (offgoing nurse). Report included the following information Nurse Handoff Report, Adult Overview, Intake/Output, MAR, Recent Results, Quality Measures, and Event Log.  Care assumed at this time.     0015: Adolfo PAIGE notified of elevate BP reading at 2348  after re-take but pt shaking and had just stood up to use bedside commode. MD aware of slight HA and med given.     0601: Cook catheter fell out at this time.     0700: Bedside and Verbal shift change report given to Jennifer RN  (oncoming nurse) by Meli RN  (offgoing nurse). Report included the following information Nurse Handoff Report, Adult Overview, Intake/Output, MAR, Recent Results, Quality Measures, and Event Log.    
4444 Call to resident MD regarding pt's bp. Will give scheduled dose of Nifedipine and repeat bp.  
Labor / Magnesium Progress Note  Patient seen, fetal heart rate and contraction pattern evaluated, patient examined. No new HA/scotoma/RUQ pain. No CP/SOB/N/V.    Patient feeling painful contractions, epidural in place minimally helpful. Had a slight headache earlier that resolved with Tylenol.  at the bedside.    No data found.    Physical Exam:    Gen: AAO x 3  CV: RRR no m/r/g  Resp: CTAB  Abdomen: gravid but soft, no tenderness in RUQ or fundus  Ext: 2+ DTRs, trace + LE edema  Cervical Exam:  8/80/0 at 0015, repeat exam deferred at this time. Is with more bloody show at 0130  Membranes:  Artificial Rupture of Membranes; Amniotic Fluid: small amount of clear fluid  Uterine Activity: q1-2 minutes, with some periods of irregular contractions  Fetal Heart Rate: Reactive  Baseline: 130 per minute  Variability: moderate  Accelerations: yes  Decelerations: none    Assessment/Plan     Esperanza Martinez is a 19 y.o. female  at 37w2d by LMP cw 9w US who is admitted for evaluation of severe range pressures and new onset HA prior to admission, admitted for IOL iso pre-E with severe features.      IOL - SIUP at 37w2d  SVE 8/80/0 at 0015. AROM at 1600 with scant clear fluid. IUPC placed. Contraction tracing improved with IUPC; external monitor adjusted. Anticipate .   - continue pit per protocol; running at 8  - pain management: epidural placed, possible plan to re-dose given pain not subsiding  - plan for next check in 2-3 hours or sooner prn  - Fetal tracing reviewed, Cat 1     Pre-E with severe features: Severe range pressure noted at prenatal visit today, with continued severe range pressures on admission. UPCR 0.3, AST 52/ALT 41, Cr 0.54, Plt 182. Today, labs stable. IOL as above. Mild range pressures currently. On Mag for seizure prophylaxis.    - Continue Mag checks q2h  - Continue nifedipine 30 mg BID     FGR:  - Saw MFM on 3/20/2025: US at 35w2d EFW 2202g, 10%ile, AC 3%ile, nml UAD and BPP.    Patient will be 
Labor / Magnesium Progress Note  Patient seen, fetal heart rate and contraction pattern evaluated, patient examined. Patient notes she is feeling more pressure in lower back, feeling more regular contractions. Is comfortable with epidural. No new HA/scotoma/RUQ pain. No CP/SOB/N/V.    No data found.    Physical Exam:    Gen: AAO x 3  CV: RRR no m/r/g  Resp: CTAB  Abdomen: gravid but soft, no tenderness in RUQ or fundus  Ext: 2+ DTRs, trace + LE edema  Cervical Exam:  7/70/0 at  per RN  Membranes:  Artificial Rupture of Membranes; Amniotic Fluid: small amount of clear fluid  Uterine Activity: q1-2 minutes, with some periods of irregular contractions  Fetal Heart Rate: Reactive  Baseline: 130 per minute  Variability: moderate  Accelerations: yes  Decelerations: none      Assessment/Plan     Esperanza Martinez is a 19 y.o. female  at 37w2d by LMP cw 9w US who is admitted for evaluation of severe range pressures and new onset HA prior to admission, admitted for IOL iso pre-E with severe features.      IOL - SIUP at 37w2d  SVE 7/70/0 at  per nurse exam. AROM at 1600 with scant clear fluid. IUPC placed. Contraction tracing improved with IUPC. Anticipate .   - continue pit per protocol; running at 8  - pain management: epidural placed   - plan for next check in 2-3 hours or sooner prn  - Fetal tracing reviewed, Cat 1     Pre-E with severe features: Severe range pressure noted at prenatal visit today, with continued severe range pressures on admission. UPCR 0.3, AST 52/ALT 41, Cr 0.54, Plt 182. Today, labs stable. IOL as above. Mild range pressures currently. On Mag for seizure prophylaxis.    - Continue Mag checks q2h  - Continue nifedipine 30 mg BID     FGR:  - Saw MFM on 3/20/2025: US at 35w2d EFW 2202g, 10%ile, AC 3%ile, nml UAD and BPP.    Patient will be discussed with NGUYEN Paiz-ASIF Powers DO  Family Practice Resident   
Labor Progress Note  Patient seen, fetal heart rate and contraction pattern evaluated, patient examined. More comfortable with epidural in place.     No data found.    Physical Exam:  Cervical Exam:  unchanged from prior   Membranes:  Artificial Rupture of Membranes; Amniotic Fluid: small amount of clear fluid  IUPC placed successfully  Fetal Heart Rate: Reactive  Baseline: 120 per minute  Variability: moderate  Accelerations: yes  Decelerations: none  Uterine contractions: irregular, every 2-4 minutes; improved tracing after IUPC placement  Uterine irritability: no      Assessment/Plan     A/P: 19 y.o.  at 37w2d by LMP cw 9w US who is admitted for evaluation of severe range pressures and new onset HA prior to admission, admitted for IOL iso pre-E with severe features.      IOL - SIUP at 37w2d  SVE 1350 , unchanged at this time. AROM at 1600 with scant clear fluid; likely amniotic fluid largely behind fetal head but fetal hair present after amniohook applied so AROM likely successful. IUPC successfully placed. Confirmed IUPC tracking contractions, already improved. Anticipate .   - continue pit per protocol  - epidural placed 1530  - next SVE in 4hr or sooner prn  - Fetal tracing reviewed, Cat 1     Pre-E with severe features: Severe range pressure noted at prenatal visit today, with continued severe range pressures on admission. UPCR 0.3, AST 52/ALT 41, Cr 0.54, Plt 182. Today, labs stable. IOL as above. Mild range pressures currently. On Mag for seizure prophylaxis.    - Continue Mag checks q2h  - Continue nifedipine 30 mg BID     FGR:  - Saw MFM on 3/20/2025: US at 35w2d EFW 2202g, 10%ile, AC 3%ile, nml UAD and BPP.     Pt discussed with Dr. Lancaster ( OB attending)   Yael Brooks MD  Family Practice Resident                      
PT c/o of headache no visual changes, BP retaken, reported to primary RN   
Pt off unit in stable condition via wheelchair with volunteers for discharge home per Dr. Gil. Pt is aware to follow up on 4/4 with [facility]. Prescriptions electronically sent to pt's pharmacy by MD.  Pt denies any HA, dizziness, N/V, or pain at this time. Infant in car seat and discharged with mother. Postpartum discharge teaching completed by this RN. Perineal supplies given to pt. Discharge papers reviewed with pt. BP cuff given to pt with BP log/reference sheet.   
Continue miso 50 q4h   - Pain control: Patient does not currently desire epidural; spoke with eric to clarify wishes. Advised patient of timing of epidural should she desire.  - Fetal tracing reviewed, reactive, baseline 140     Pre-E with severe features: Severe range pressure noted at prenatal visit today, with continued severe range pressures on admission. UPCR 0.3, AST 52/ALT 41, Cr 0.54, Plt 182 . S/p labetalol 20 mg IV, 1 dose nifedipine 30 mg. BP's have remained below severe range. On Mag for seizure prophylaxis.   - Continue Mag checks q2h   - Continue nifedipine 30 mg qd     FGR:  - Saw MFM on 3/20/2025: US EFW 2202 g 10%ile, AC 3%ile, nor UAD and BPP.     Patient will be discussed with Dr. Mata (OB Hospitalist).   Kirill Powers DO  PGY-1 Family Medicine Resident     
curve today  - consider abx if does not improve    Patient discussed with Dr. Damico.                 Yael Brooks MD  Family Medicine Resident   
in place. Anticipate . No contractions currently.  - Plan for next cervical check in 12 hours or when bates bulb out  - Continue miso 50 q4h   - Pain control: Patient does not currently desire epidural   - Fetal tracing reviewed, reactive, baseline 140       Pre-E with severe features: Severe range pressure noted at prenatal visit today, with continued severe range pressures on admission. UPCR 0.3, AST 52/ALT 41, Cr 0.54, Plt 182 . S/p labetalol 20 mg IV, 1 dose nifedipine 30 mg. BP's have remained below severe range. On Mag for seizure prophylaxis, notes resolution of headache.   - Continue Mag checks q2h   - Continue nifedipine 30 mg qd     FGR:  - Saw MFM on 3/20/2025: US EFW 2202 g 10%ile, AC 3%ile, nor UAD and BPP.     Patient will be discussed with Dr. Mata (OB Hospitalist).   Kirill Powers DO  PGY-1 Family Medicine Resident     
with continued severe range pressures on admission. UPCR 0.3, AST 52/ALT 41, Cr 0.54, Plt 182. Today, labs stable. IOL as above. Mild range pressures currently. On Mag for seizure prophylaxis.    - Continue Mag checks q2h  - Continue nifedipine 30 mg BID     FGR:  - Saw [facility] on 3/20/2025: US at 35w2d EFW 2202g, 10%ile, AC 3%ile, nml UAD and BPP.         Pt discussed with Dr. Lancaster ( OB attending).   Yael Brooks MD

## 2025-03-30 NOTE — DISCHARGE INSTRUCTIONS
Patient Discharge Instructions    Esperanza Martinez / [id number] : 2005    Admitted 3/26/2025 Discharged: 3/30/2025       Please bring this form with you to show your care provider at your follow-up appointment.    Primary care provider:  Yael Brooks     Discharging provider:  Yael Brooks MD  - Family Medicine resident     Discharging attending:  Azul Melchor MD     ACUTE DIAGNOSES:  PIH (pregnancy induced hypertension), third trimester [O13.3]  Pre-eclampsia in third trimester [O14.93]    FOLLOW-UP CARE RECOMMENDATIONS:    Future Appointments   Date Time Provider Department Center   2025  9:00 AM oJsh Hunter MD FP [facility]       Continuing Therapy:  - Please continue Motrin 800 mg, 1 tablet every 8 hours for the next 2 days, then 1 tablet every 8 hours as needed for pain  - Please continue Colace 100 mg for constipation, take one tablet daily until having regular bowel movements  - Please take Ferrous Sulfate 325 mg for anemia, Take 1 tablet every other day with Orange juice  - Please continue your prenatal vitamins    Specific symptoms to watch for: chest pain, shortness of breath, fever, chills, nausea, vomiting, diarrhea, change in mentation, falling, weakness, bleeding.     DIET/what to eat:  Regular Diet    ACTIVITY:   Activity Instructions    Do not lift anything heavier than your baby for 6 weeks. Nothing in the vagina for 6 weeks. After having a /vaginal delivery you will still need to wait about six weeks before having sex. You will have your six-week postpartum check-up at this time.You are ok to drive as long as you are not taking Percocet or other narcotic pain medication (Motrin is ok).       Wound care:  fill the malorie bottle with warm water and squeeze it: a jet of water will shoot out the nozzle to help you cleanse and wash your perineal area.      I understand that if any problems occur once I am at home I am to contact my physician.    These instructions were explained to

## 2025-04-04 ENCOUNTER — OFFICE VISIT (OUTPATIENT)
Age: 20
End: 2025-04-04

## 2025-04-04 VITALS
WEIGHT: 144 LBS | OXYGEN SATURATION: 98 % | HEART RATE: 73 BPM | SYSTOLIC BLOOD PRESSURE: 119 MMHG | DIASTOLIC BLOOD PRESSURE: 79 MMHG | BODY MASS INDEX: 27.22 KG/M2 | TEMPERATURE: 98.7 F

## 2025-04-04 NOTE — PROGRESS NOTES
[address] [id number]   Office [phone], Fax [phone]    Subjective:     History provided by patient     Post Partum Note    19 y.o. female , presenting for postpartum visit s/p  at 37w3d 9 days ago. She was induced for Pre-E with severe features, IUGR, but labor was uncomplicated. She received mag for seizure prophylaxis during induction and for 24h postpartum. She was discharged on nifedipine 30mg BID and blood pressure has been looking good at home (see listed BP log below)  Patient doing well post-partum without significant complaint. She denies HA, vision changes, CP, palpitations, dyspnea, RUQ pain, leg pain/swelling. She is present with her father and her new baby and reports doing great from emotional/mental perspective as well.    BP Lo/82   123/80  123/93  129/87  119/76  123/70  112/72    Lochia: normal  Pain: controlled  Baby: doing well, has seen PCP  Sexual activity: No  Plan for contraception: No plans, no plans for sexual activity  Breast/bottle: Breast-feeding well  Support from FOB/family: Yes, parents. FOB not involved  Symptoms of depression: None  EDPS score: 0    SocHx.   - Denies smoking, alcohol use, illicit drug use    Objective:     Vitals:    25 0913   BP: 119/79   Pulse: 73   Temp: 98.7 °F (37.1 °C)   SpO2: 98%         Exam:  Patient without distress.    Abdomen soft, fundus firm at level of umbilicus, nontender.               Lower extremities:  No edema. No palpable cords or tenderness.    Assessment and orders:     Assessment and Plan:    Esperanza Martinez is a 19 y.o.  s/p induced  at 37w3d iso pre-E with severe features and IUGR.  Patient having uncomplicated post-partum course. She is taking nifedipine 30mg BID as prescribed with BP within range.    Pt Instructions:  Please check your blood pressure in the morning before taking your medication. If you are below 120/80 on this check, you can take your nifedipine once per

## 2025-04-04 NOTE — PROGRESS NOTES
Identified pt with two pt identifiers(name and ). Reviewed record in preparation for visit and have obtained necessary documentation.  Chief Complaint   Patient presents with    Postpartum Care        Vitals:    25 0913   BP: 119/79   Pulse: 73   Temp: 98.7 °F (37.1 °C)   TempSrc: Infrared   SpO2: 98%   Weight: 65.3 kg (144 lb)         Coordination of Care Questionnaire:  :     \"Have you been to the ER, urgent care clinic since your last visit?  Hospitalized since your last visit?\"    NO    “Have you seen or consulted any other health care providers outside of [facility] since your last visit?”    NO            Click Here for Release of Records Request

## 2025-04-04 NOTE — PATIENT INSTRUCTIONS
Please check your blood pressure in the morning before taking your medication. If you are below 120/80 on this check, you can take your nifedipine once per day. If you are above this, please continue to take it twice per day. We want you to come back in 2 weeks to re-evaluate. Please continue to check your blood pressures as you have been

## 2025-04-18 ENCOUNTER — OFFICE VISIT (OUTPATIENT)
Age: 20
End: 2025-04-18
Payer: MEDICAID

## 2025-04-18 VITALS
SYSTOLIC BLOOD PRESSURE: 105 MMHG | DIASTOLIC BLOOD PRESSURE: 69 MMHG | TEMPERATURE: 97.7 F | HEART RATE: 87 BPM | BODY MASS INDEX: 26.66 KG/M2 | OXYGEN SATURATION: 98 % | WEIGHT: 141 LBS

## 2025-04-18 PROCEDURE — 99213 OFFICE O/P EST LOW 20 MIN: CPT

## 2025-04-18 NOTE — PROGRESS NOTES
[address] [id number]   Office [phone], Fax [phone]    Subjective:     History provided by patient     2025:  19 y.o. female , presenting for postpartum visit s/p  at 37w3d 9 days ago. She was induced for Pre-E with severe features, IUGR, but labor was uncomplicated. She received mag for seizure prophylaxis during induction and for 24h postpartum. She was discharged on nifedipine 30mg BID and blood pressure has been looking good at home (see listed BP log below)  Patient doing well post-partum without significant complaint. She denies HA, vision changes, CP, palpitations, dyspnea, RUQ pain, leg pain/swelling. She is present with her father and her new baby and reports doing great from emotional/mental perspective as well.    Today, 2025:  - Patient reports she took her nifedipine once per day for a week and then is now 6 days into not taking any at all, home BP readings all normotensive    Objective:     Vitals:    25 1023   BP: 105/69   Pulse: 87   Temp: 97.7 °F (36.5 °C)   SpO2: 98%         Exam:  Patient without distress.   Heart rrr, -mrg, Lungs CTAB    Abdomen soft, fundus firm at level of umbilicus, nontender.               Lower extremities:  No edema. No palpable cords or tenderness.    Assessment and orders:     Assessment and Plan:    Esperanza Martinez is a 19 y.o.  s/p induced  at 37w3d iso pre-E with severe features and IUGR.  Patient having uncomplicated post-partum course. She has now safely discontinued her anti-hypertensive medication that was started during induction in the hospital. I instructed her to continue to monitor BP over the weekend. She is doing well overall and at this point can follow up prn or for annual physicals. Please see my note from 2025 for more postpartum history and info.    Pt was discussed with Dr Spicer (attending physician).  Terrence Gallo DO    no

## 2025-04-18 NOTE — PROGRESS NOTES
Identified pt with two pt identifiers(name and ). Reviewed record in preparation for visit and have obtained necessary documentation.  Chief Complaint   Patient presents with    Blood Pressure Check        Vitals:    25 1023   BP: 105/69   Pulse: 87   Temp: 97.7 °F (36.5 °C)   TempSrc: Infrared   SpO2: 98%   Weight: 64 kg (141 lb)         Coordination of Care Questionnaire:  :     \"Have you been to the ER, urgent care clinic since your last visit?  Hospitalized since your last visit?\"    NO    “Have you seen or consulted any other health care providers outside of [facility] since your last visit?”    NO            Click Here for Release of Records Request

## (undated) DEVICE — SOLUTION IRRIG 500ML 0.9% SOD CHL USP POUR PLAS BTL

## (undated) DEVICE — COVER LT HNDL BLU PLAS

## (undated) DEVICE — HYDROPHILIC COATED RED RUBBER URETHRAL CATHETER, SMOOTH ROUNDED TIP, 12 FR (4.0 MM): Brand: DOVER

## (undated) DEVICE — TUBING, SUCTION, 1/4" X 12', STRAIGHT: Brand: MEDLINE

## (undated) DEVICE — GLOVE ORANGE PI 7 1/2   MSG9075

## (undated) DEVICE — SYR 5ML 1/5 GRAD LL NSAF LF --

## (undated) DEVICE — BASIC SINGLE BASIN-LF: Brand: MEDLINE INDUSTRIES, INC.

## (undated) DEVICE — CHS T&A PACK: Brand: MEDLINE INDUSTRIES, INC.

## (undated) DEVICE — DRAPE,REIN 53X77,STERILE: Brand: MEDLINE

## (undated) DEVICE — REM POLYHESIVE ADULT PATIENT RETURN ELECTRODE: Brand: VALLEYLAB

## (undated) DEVICE — EVAC 70 XTRA WAND: Brand: COBLATION

## (undated) DEVICE — TOWEL SURG W17XL27IN STD BLU COT NONFENESTRATED PREWASHED

## (undated) DEVICE — KIT,ANTI FOG,W/SPONGE & FLUID,SOFT PACK: Brand: MEDLINE

## (undated) DEVICE — HYPODERMIC SAFETY NEEDLE: Brand: MONOJECT

## (undated) DEVICE — SYRINGE IRRIG 60ML SFT PLIABLE BLB EZ TO GRP 1 HND USE W/

## (undated) DEVICE — SOUTHSIDE TURNOVER: Brand: MEDLINE INDUSTRIES, INC.